# Patient Record
Sex: FEMALE | Race: BLACK OR AFRICAN AMERICAN | NOT HISPANIC OR LATINO | Employment: UNEMPLOYED | URBAN - METROPOLITAN AREA
[De-identification: names, ages, dates, MRNs, and addresses within clinical notes are randomized per-mention and may not be internally consistent; named-entity substitution may affect disease eponyms.]

---

## 2017-07-06 ENCOUNTER — HOSPITAL ENCOUNTER (OUTPATIENT)
Dept: OTHER | Facility: HOSPITAL | Age: 47
Discharge: HOME | End: 2017-07-06

## 2018-12-12 ENCOUNTER — HOSPITAL ENCOUNTER (OUTPATIENT)
Dept: OTHER | Facility: HOSPITAL | Age: 48
Discharge: HOME | End: 2018-12-12

## 2020-02-28 ENCOUNTER — HOSPITAL ENCOUNTER (OUTPATIENT)
Dept: OTHER | Facility: HOSPITAL | Age: 50
Discharge: HOME | End: 2020-02-28

## 2021-02-24 ENCOUNTER — HOSPITAL ENCOUNTER (OUTPATIENT)
Dept: OTHER | Facility: HOSPITAL | Age: 51
Discharge: HOME | End: 2021-02-24

## 2024-07-22 ENCOUNTER — APPOINTMENT (OUTPATIENT)
Dept: GENERAL RADIOLOGY | Facility: HOSPITAL | Age: 54
End: 2024-07-22
Payer: COMMERCIAL

## 2024-07-22 ENCOUNTER — APPOINTMENT (OUTPATIENT)
Facility: HOSPITAL | Age: 54
End: 2024-07-22
Payer: COMMERCIAL

## 2024-07-22 ENCOUNTER — HOSPITAL ENCOUNTER (EMERGENCY)
Facility: HOSPITAL | Age: 54
Discharge: HOME | End: 2024-07-22
Attending: EMERGENCY MEDICINE
Payer: COMMERCIAL

## 2024-07-22 ENCOUNTER — APPOINTMENT (OUTPATIENT)
Dept: CT IMAGING | Facility: HOSPITAL | Age: 54
End: 2024-07-22
Payer: COMMERCIAL

## 2024-07-22 VITALS
HEIGHT: 70 IN | RESPIRATION RATE: 16 BRPM | DIASTOLIC BLOOD PRESSURE: 94 MMHG | WEIGHT: 246.03 LBS | BODY MASS INDEX: 35.22 KG/M2 | SYSTOLIC BLOOD PRESSURE: 134 MMHG | OXYGEN SATURATION: 97 % | TEMPERATURE: 98.6 F | HEART RATE: 67 BPM

## 2024-07-22 DIAGNOSIS — M17.12 OSTEOARTHRITIS OF LEFT KNEE, UNSPECIFIED OSTEOARTHRITIS TYPE: Primary | ICD-10-CM

## 2024-07-22 DIAGNOSIS — I83.12 VARICOSE VEINS OF LEFT LOWER EXTREMITY WITH INFLAMMATION: ICD-10-CM

## 2024-07-22 DIAGNOSIS — K21.9 GASTROESOPHAGEAL REFLUX DISEASE WITHOUT ESOPHAGITIS: ICD-10-CM

## 2024-07-22 LAB
ALBUMIN SERPL-MCNC: 4.8 G/DL (ref 3.5–5.2)
ALBUMIN/GLOB SERPL: 1.5 G/DL
ALP SERPL-CCNC: 128 U/L (ref 39–117)
ALT SERPL W P-5'-P-CCNC: 23 U/L (ref 1–33)
ANION GAP SERPL CALCULATED.3IONS-SCNC: 11.9 MMOL/L (ref 5–15)
AST SERPL-CCNC: 23 U/L (ref 1–32)
BASOPHILS # BLD AUTO: 0.04 10*3/MM3 (ref 0–0.2)
BASOPHILS NFR BLD AUTO: 0.8 % (ref 0–1.5)
BILIRUB SERPL-MCNC: 1.8 MG/DL (ref 0–1.2)
BUN SERPL-MCNC: 12 MG/DL (ref 6–20)
BUN/CREAT SERPL: 14 (ref 7–25)
CALCIUM SPEC-SCNC: 10.5 MG/DL (ref 8.6–10.5)
CHLORIDE SERPL-SCNC: 101 MMOL/L (ref 98–107)
CO2 SERPL-SCNC: 28.1 MMOL/L (ref 22–29)
CREAT SERPL-MCNC: 0.86 MG/DL (ref 0.57–1)
DEPRECATED RDW RBC AUTO: 39.4 FL (ref 37–54)
EGFRCR SERPLBLD CKD-EPI 2021: 81.4 ML/MIN/1.73
EOSINOPHIL # BLD AUTO: 0.11 10*3/MM3 (ref 0–0.4)
EOSINOPHIL NFR BLD AUTO: 2.1 % (ref 0.3–6.2)
ERYTHROCYTE [DISTWIDTH] IN BLOOD BY AUTOMATED COUNT: 15.4 % (ref 12.3–15.4)
GLOBULIN UR ELPH-MCNC: 3.1 GM/DL
GLUCOSE SERPL-MCNC: 165 MG/DL (ref 65–99)
HCT VFR BLD AUTO: 41.1 % (ref 34–46.6)
HGB BLD-MCNC: 12.9 G/DL (ref 12–15.9)
HOLD SPECIMEN: NORMAL
HOLD SPECIMEN: NORMAL
IMM GRANULOCYTES # BLD AUTO: 0.01 10*3/MM3 (ref 0–0.05)
IMM GRANULOCYTES NFR BLD AUTO: 0.2 % (ref 0–0.5)
LYMPHOCYTES # BLD AUTO: 2.04 10*3/MM3 (ref 0.7–3.1)
LYMPHOCYTES NFR BLD AUTO: 39.2 % (ref 19.6–45.3)
MCH RBC QN AUTO: 22.8 PG (ref 26.6–33)
MCHC RBC AUTO-ENTMCNC: 31.4 G/DL (ref 31.5–35.7)
MCV RBC AUTO: 72.6 FL (ref 79–97)
MICROCYTES BLD QL: NORMAL
MONOCYTES # BLD AUTO: 0.3 10*3/MM3 (ref 0.1–0.9)
MONOCYTES NFR BLD AUTO: 5.8 % (ref 5–12)
NEUTROPHILS NFR BLD AUTO: 2.7 10*3/MM3 (ref 1.7–7)
NEUTROPHILS NFR BLD AUTO: 51.9 % (ref 42.7–76)
NRBC BLD AUTO-RTO: 0 /100 WBC (ref 0–0.2)
NT-PROBNP SERPL-MCNC: 129 PG/ML (ref 0–900)
OVALOCYTES BLD QL SMEAR: NORMAL
PLAT MORPH BLD: NORMAL
PLATELET # BLD AUTO: 288 10*3/MM3 (ref 140–450)
PMV BLD AUTO: 9.9 FL (ref 6–12)
POTASSIUM SERPL-SCNC: 3.7 MMOL/L (ref 3.5–5.2)
PROT SERPL-MCNC: 7.9 G/DL (ref 6–8.5)
RBC # BLD AUTO: 5.66 10*6/MM3 (ref 3.77–5.28)
SODIUM SERPL-SCNC: 141 MMOL/L (ref 136–145)
TROPONIN T SERPL HS-MCNC: <6 NG/L
WBC MORPH BLD: NORMAL
WBC NRBC COR # BLD AUTO: 5.2 10*3/MM3 (ref 3.4–10.8)
WHOLE BLOOD HOLD COAG: NORMAL
WHOLE BLOOD HOLD SPECIMEN: NORMAL

## 2024-07-22 PROCEDURE — 93971 EXTREMITY STUDY: CPT

## 2024-07-22 PROCEDURE — 93005 ELECTROCARDIOGRAM TRACING: CPT

## 2024-07-22 PROCEDURE — 25010000002 MORPHINE PER 10 MG: Performed by: EMERGENCY MEDICINE

## 2024-07-22 PROCEDURE — 73562 X-RAY EXAM OF KNEE 3: CPT

## 2024-07-22 PROCEDURE — 71045 X-RAY EXAM CHEST 1 VIEW: CPT

## 2024-07-22 PROCEDURE — 84484 ASSAY OF TROPONIN QUANT: CPT

## 2024-07-22 PROCEDURE — 80053 COMPREHEN METABOLIC PANEL: CPT

## 2024-07-22 PROCEDURE — 83880 ASSAY OF NATRIURETIC PEPTIDE: CPT

## 2024-07-22 PROCEDURE — 85025 COMPLETE CBC W/AUTO DIFF WBC: CPT

## 2024-07-22 PROCEDURE — 99285 EMERGENCY DEPT VISIT HI MDM: CPT

## 2024-07-22 PROCEDURE — 96374 THER/PROPH/DIAG INJ IV PUSH: CPT

## 2024-07-22 PROCEDURE — 25510000001 IOPAMIDOL PER 1 ML: Performed by: EMERGENCY MEDICINE

## 2024-07-22 PROCEDURE — 36415 COLL VENOUS BLD VENIPUNCTURE: CPT

## 2024-07-22 PROCEDURE — 85007 BL SMEAR W/DIFF WBC COUNT: CPT

## 2024-07-22 PROCEDURE — 25010000002 KETOROLAC TROMETHAMINE PER 15 MG: Performed by: EMERGENCY MEDICINE

## 2024-07-22 PROCEDURE — 93005 ELECTROCARDIOGRAM TRACING: CPT | Performed by: EMERGENCY MEDICINE

## 2024-07-22 PROCEDURE — 25010000002 ONDANSETRON PER 1 MG: Performed by: EMERGENCY MEDICINE

## 2024-07-22 PROCEDURE — 71260 CT THORAX DX C+: CPT

## 2024-07-22 PROCEDURE — 96375 TX/PRO/DX INJ NEW DRUG ADDON: CPT

## 2024-07-22 PROCEDURE — 93971 EXTREMITY STUDY: CPT | Performed by: SURGERY

## 2024-07-22 PROCEDURE — 93010 ELECTROCARDIOGRAM REPORT: CPT | Performed by: INTERNAL MEDICINE

## 2024-07-22 RX ORDER — CYCLOBENZAPRINE HCL 10 MG
10 TABLET ORAL ONCE
Status: COMPLETED | OUTPATIENT
Start: 2024-07-22 | End: 2024-07-22

## 2024-07-22 RX ORDER — SPIRONOLACTONE 25 MG/1
TABLET ORAL
COMMUNITY

## 2024-07-22 RX ORDER — SOTALOL HYDROCHLORIDE 80 MG/1
1 TABLET ORAL EVERY 12 HOURS SCHEDULED
COMMUNITY
Start: 2024-06-22

## 2024-07-22 RX ORDER — PANTOPRAZOLE SODIUM 40 MG/10ML
40 INJECTION, POWDER, LYOPHILIZED, FOR SOLUTION INTRAVENOUS ONCE
Status: COMPLETED | OUTPATIENT
Start: 2024-07-22 | End: 2024-07-22

## 2024-07-22 RX ORDER — APIXABAN 5 MG/1
5 TABLET, FILM COATED ORAL 2 TIMES DAILY
COMMUNITY

## 2024-07-22 RX ORDER — DULAGLUTIDE 1.5 MG/.5ML
INJECTION, SOLUTION SUBCUTANEOUS
COMMUNITY

## 2024-07-22 RX ORDER — PANTOPRAZOLE SODIUM 40 MG/1
1 TABLET, DELAYED RELEASE ORAL DAILY
COMMUNITY
Start: 2024-04-25

## 2024-07-22 RX ORDER — POTASSIUM CHLORIDE 750 MG/1
CAPSULE, EXTENDED RELEASE ORAL
COMMUNITY

## 2024-07-22 RX ORDER — FUROSEMIDE 20 MG/1
TABLET ORAL
COMMUNITY

## 2024-07-22 RX ORDER — SEMAGLUTIDE 0.68 MG/ML
INJECTION, SOLUTION SUBCUTANEOUS
COMMUNITY
Start: 2024-04-29

## 2024-07-22 RX ORDER — DILTIAZEM HYDROCHLORIDE 120 MG/1
CAPSULE, COATED, EXTENDED RELEASE ORAL
COMMUNITY

## 2024-07-22 RX ORDER — HYDROCODONE BITARTRATE AND ACETAMINOPHEN 5; 325 MG/1; MG/1
1 TABLET ORAL EVERY 6 HOURS PRN
Status: DISCONTINUED | OUTPATIENT
Start: 2024-07-22 | End: 2024-07-22

## 2024-07-22 RX ORDER — FLECAINIDE ACETATE 50 MG/1
TABLET ORAL
COMMUNITY

## 2024-07-22 RX ORDER — METOPROLOL SUCCINATE 100 MG/1
TABLET, EXTENDED RELEASE ORAL
COMMUNITY

## 2024-07-22 RX ORDER — ATORVASTATIN CALCIUM 20 MG/1
20 TABLET, FILM COATED ORAL DAILY
COMMUNITY

## 2024-07-22 RX ORDER — FAMOTIDINE 20 MG/1
20 TABLET, FILM COATED ORAL 2 TIMES DAILY
Qty: 30 TABLET | Refills: 0 | Status: SHIPPED | OUTPATIENT
Start: 2024-07-22

## 2024-07-22 RX ORDER — SEMAGLUTIDE 1.34 MG/ML
INJECTION, SOLUTION SUBCUTANEOUS
COMMUNITY

## 2024-07-22 RX ORDER — ONDANSETRON 2 MG/ML
4 INJECTION INTRAMUSCULAR; INTRAVENOUS ONCE
Status: COMPLETED | OUTPATIENT
Start: 2024-07-22 | End: 2024-07-22

## 2024-07-22 RX ORDER — SODIUM CHLORIDE 0.9 % (FLUSH) 0.9 %
10 SYRINGE (ML) INJECTION AS NEEDED
Status: DISCONTINUED | OUTPATIENT
Start: 2024-07-22 | End: 2024-07-23 | Stop reason: HOSPADM

## 2024-07-22 RX ORDER — INSULIN LISPRO 100 [IU]/ML
INJECTION, SOLUTION INTRAVENOUS; SUBCUTANEOUS
COMMUNITY

## 2024-07-22 RX ORDER — HYDROCODONE BITARTRATE AND ACETAMINOPHEN 5; 325 MG/1; MG/1
1 TABLET ORAL EVERY 6 HOURS PRN
Qty: 12 TABLET | Refills: 0 | Status: SHIPPED | OUTPATIENT
Start: 2024-07-22

## 2024-07-22 RX ORDER — DILTIAZEM HYDROCHLORIDE 60 MG/1
TABLET, FILM COATED ORAL
COMMUNITY

## 2024-07-22 RX ORDER — KETOROLAC TROMETHAMINE 15 MG/ML
15 INJECTION, SOLUTION INTRAMUSCULAR; INTRAVENOUS ONCE
Status: COMPLETED | OUTPATIENT
Start: 2024-07-22 | End: 2024-07-22

## 2024-07-22 RX ORDER — HYDROCODONE BITARTRATE AND ACETAMINOPHEN 5; 325 MG/1; MG/1
1 TABLET ORAL ONCE
Status: COMPLETED | OUTPATIENT
Start: 2024-07-22 | End: 2024-07-22

## 2024-07-22 RX ADMIN — HYDROCODONE BITARTRATE AND ACETAMINOPHEN 1 TABLET: 5; 325 TABLET ORAL at 21:09

## 2024-07-22 RX ADMIN — PANTOPRAZOLE SODIUM 40 MG: 40 INJECTION, POWDER, FOR SOLUTION INTRAVENOUS at 21:08

## 2024-07-22 RX ADMIN — CYCLOBENZAPRINE HYDROCHLORIDE 10 MG: 10 TABLET, FILM COATED ORAL at 22:05

## 2024-07-22 RX ADMIN — IOPAMIDOL 100 ML: 755 INJECTION, SOLUTION INTRAVENOUS at 21:33

## 2024-07-22 RX ADMIN — ONDANSETRON 4 MG: 2 INJECTION INTRAMUSCULAR; INTRAVENOUS at 19:40

## 2024-07-22 RX ADMIN — KETOROLAC TROMETHAMINE 15 MG: 15 INJECTION, SOLUTION INTRAMUSCULAR; INTRAVENOUS at 19:40

## 2024-07-22 RX ADMIN — MORPHINE SULFATE 4 MG: 4 INJECTION, SOLUTION INTRAMUSCULAR; INTRAVENOUS at 19:40

## 2024-07-22 NOTE — ED PROVIDER NOTES
Time: 6:53 PM EDT  Date of encounter:  7/22/2024  Independent Historian/Clinical History and Information was obtained by:   Patient    History is limited by: N/A    Chief Complaint   Patient presents with    Chest Pain    Leg Pain         History of Present Illness:  Patient is a 54 y.o. year old female who presents to the emergency department for evaluation of left leg pain behind left knee.  Patient has recently had 2 long distance trips and is concerned about a possible blood clot.  She has a history of CHF.  Her primary care provider wanted her to start taking Eliquis due to fear she may develop a clot because of her circulation problems.  She started Eliquis 2 to 3 days ago.    Patient has traveled last month from [place] to [place].  She started driving today to [place] and due to the pain she had to turnaround to come home.  He describes the pain as a throbbing sensation.  It starts in the posterior left knee and radiates up into her thigh.  Patient states she is also having chest pain that is a burning sensation.  She takes Protonix for GERD.  She has not taken it today due to not eating prior.  Patient denies fall or injury.  She admits to shortness of breath.    Patient Care Team  Primary Care Provider: Mindy Chavez APRN    Past Medical History:     Allergies   Allergen Reactions    Fentanyl Hives    Promethazine Itching     Past Medical History:   Diagnosis Date    A-fib     CHF (congestive heart failure)     DM (diabetes mellitus)     HTN (hypertension)      Past Surgical History:   Procedure Laterality Date    GALLBLADDER SURGERY      HERNIA REPAIR      HYSTERECTOMY      PACEMAKER IMPLANTATION  2014     History reviewed. No pertinent family history.    Home Medications:  Prior to Admission medications    Not on File        Social History:   Social History     Tobacco Use    Smoking status: Never    Smokeless tobacco: Never   Substance Use Topics    Alcohol use: Never    Drug use: Never  "        Review of Systems:  Review of Systems   Constitutional: Negative.    HENT: Negative.     Eyes: Negative.    Respiratory:  Positive for shortness of breath.    Cardiovascular:  Positive for chest pain (Burning sensation).   Gastrointestinal: Negative.    Endocrine: Negative.    Genitourinary: Negative.    Musculoskeletal:  Positive for arthralgias and myalgias.   Skin: Negative.  Negative for color change and wound.   Allergic/Immunologic: Negative.    Neurological: Negative.    Hematological: Negative.    Psychiatric/Behavioral: Negative.          Physical Exam:  /94 (BP Location: Right arm, Patient Position: Sitting)   Pulse 67   Temp 98.6 °F (37 °C) (Oral)   Resp 16   Ht 177.8 cm (70\")   Wt 112 kg (246 lb 0.5 oz)   SpO2 97%   BMI 35.30 kg/m²         Physical Exam  Vitals and nursing note reviewed.   Constitutional:       Appearance: Normal appearance.   HENT:      Head: Normocephalic and atraumatic.      Nose: Nose normal.      Mouth/Throat:      Mouth: Mucous membranes are moist.   Eyes:      Extraocular Movements: Extraocular movements intact.      Pupils: Pupils are equal, round, and reactive to light.   Cardiovascular:      Rate and Rhythm: Normal rate and regular rhythm.      Heart sounds: Normal heart sounds.   Pulmonary:      Effort: Pulmonary effort is normal.      Breath sounds: Normal breath sounds.   Abdominal:      General: Abdomen is flat.      Palpations: Abdomen is soft.      Tenderness: There is abdominal tenderness in the epigastric area.   Musculoskeletal:         General: Tenderness present. Normal range of motion.      Cervical back: Normal range of motion and neck supple.      Left foot: Normal capillary refill. No swelling. Normal pulse.        Legs:    Skin:     General: Skin is warm and dry.      Findings: No bruising or erythema.   Neurological:      General: No focal deficit present.      Mental Status: She is alert and oriented to person, place, and time. "   Psychiatric:         Mood and Affect: Mood normal.         Behavior: Behavior normal.                  Procedures:  Procedures      Medical Decision Making:      Comorbidities that affect care:    Atrial Fibrillation, Congestive Heart Failure, Diabetes, Hypertension, Obesity    External Notes reviewed:    Previous Clinic Note: PCP visit 4/5/2024      The following orders were placed and all results were independently analyzed by me:  Orders Placed This Encounter   Procedures    XR Chest 1 View    XR Knee 3 View Left    CT Chest With Contrast Diagnostic    [place] Draw    Comprehensive Metabolic Panel    BNP    Single High Sensitivity Troponin T    CBC Auto Differential    Scan Slide    Undress & Gown    Continuous Pulse Oximetry    Vital Signs    ECG 12 Lead ED Triage Standing Order; SOA    CBC & Differential    Green Top (Gel)    Lavender Top    Gold Top - SST    Light Blue Top       Medications Given in the Emergency Department:  Medications   morphine injection 4 mg (4 mg Intravenous Given 7/22/24 1940)   ondansetron (ZOFRAN) injection 4 mg (4 mg Intravenous Given 7/22/24 1940)   ketorolac (TORADOL) injection 15 mg (15 mg Intravenous Given 7/22/24 1940)   pantoprazole (PROTONIX) injection 40 mg (40 mg Intravenous Given 7/22/24 2108)   HYDROcodone-acetaminophen (NORCO) 5-325 MG per tablet 1 tablet (1 tablet Oral Given 7/22/24 2109)   iopamidol (ISOVUE-370) 76 % injection 100 mL (100 mL Intravenous Given 7/22/24 2133)   cyclobenzaprine (FLEXERIL) tablet 10 mg (10 mg Oral Given 7/22/24 2205)        ED Course:    The patient was initially evaluated in the triage area where orders were placed. The patient was later dispositioned by Anthony Meyer PA-C.      The patient was advised to stay for completion of workup which includes but is not limited to communication of labs and radiological results, reassessment and plan. The patient was advised that leaving prior to disposition by a provider could result in  critical findings that are not communicated to the patient.     ED Course as of 07/24/24 0011   Mon Jul 22, 2024 1853   --- PROVIDER IN TRIAGE NOTE ---    Patient was seen and evaluated in triage by me SANTA Matthews.  Orders were written and the patient is currently awaiting disposition.   [MS]   1853 Patient recently drove from [place] to [place] about a month ago.  And over the past 2 days she has driven to [place] and back. [MS]   2039 Vascular tech advises that patient is negative for DVT [MS]      ED Course User Index  [MS] Corry Bowman APRN       Labs:    Lab Results (last 24 hours)       ** No results found for the last 24 hours. **             Imaging:    No Radiology Exams Resulted Within Past 24 Hours      Differential Diagnosis and Discussion:      Abdominal Pain: Based on the patient's signs and symptoms, I considered abdominal aortic aneurysm, small bowel obstruction, pancreatitis, acute cholecystitis, acute appendecitis, peptic ulcer disease, gastritis, colitis, endocrine disorders, irritable bowel syndrome and other differential diagnosis an etiology of the patient's abdominal pain.  Chest Pain:  Based on the patient's signs and symptoms, I considered aortic dissection, myocardial infaction, pulmonary embolism, cardiac tamponade, pericarditis, pneumothorax, musculoskeletal chest pain and other differential diagnosis as an etiology of the patient's chest pain.   Extremity Pain: Differential diagnosis includes but is not limited to soft tissue sprain, tendonitis, tendon injury, dislocation, fracture, deep vein thrombosis, arterial insufficiency, osteoarthritis, bursitis, and ligamentous damage.    All labs were reviewed and interpreted by me.  All X-rays impressions were independently interpreted by me.  EKG was interpreted by me.  EKG was interpreted by supervising attending.  CT scan radiology impression was interpreted by me.  Ultrasound impression was interpreted by me.     MDM      Amount and/or Complexity of Data Reviewed  Clinical lab tests: reviewed  Tests in the radiology section of CPT®: reviewed  Tests in the medicine section of CPT®: reviewed                 Patient Care Considerations:    SEPSIS was considered but is NOT present in the emergency department as SIRS criteria is not present.      Consultants/Shared Management Plan:    None    Social Determinants of Health:    Patient is independent, reliable, and has access to care.       Disposition and Care Coordination:    Discharged: The patient is suitable and stable for discharge with no need for consideration of admission.    I have explained the patient´s condition, diagnoses and treatment plan based on the information available to me at this time. I have answered questions and addressed any concerns. The patient has a good  understanding of the patient´s diagnosis, condition, and treatment plan as can be expected at this point. The vital signs have been stable. The patient´s condition is stable and appropriate for discharge from the emergency department.      The patient will pursue further outpatient evaluation with the primary care physician or other designated or consulting physician as outlined in the discharge instructions. They are agreeable to this plan of care and follow-up instructions have been explained in detail. The patient has received these instructions in written format and has expressed an understanding of the discharge instructions. The patient is aware that any significant change in condition or worsening of symptoms should prompt an immediate return to this or the closest emergency department or call to 911.  I have explained discharge medications and the need for follow up with the patient/caretakers. This was also printed in the discharge instructions. Patient was discharged with the following medications and follow up:      Medication List        New Prescriptions      famotidine 20 MG tablet  Commonly  known as: PEPCID  Take 1 tablet by mouth 2 (Two) Times a Day.     HYDROcodone-acetaminophen 5-325 MG per tablet  Commonly known as: NORCO  Take 1 tablet by mouth Every 6 (Six) Hours As Needed for Mild Pain or Moderate Pain.               Where to Get Your Medications        These medications were sent to [facility] [address], [place] - [address] - [phone]  - [phone]   [address] [address]      Phone: [phone]   famotidine 20 MG tablet  HYDROcodone-acetaminophen 5-325 MG per tablet      Mindy Chavez, APRN  [address]  [address]  [place] [address] [phone]    Schedule an appointment as soon as possible for a visit          Final diagnoses:   Osteoarthritis of left knee, unspecified osteoarthritis type   Varicose veins of left lower extremity with inflammation   Gastroesophageal reflux disease without esophagitis        ED Disposition       ED Disposition   Discharge    Condition   Stable    Comment   --               This medical record created using voice recognition software.             Anthony Meyer PA-C  07/24/24 0011

## 2024-07-23 LAB
BH CV LOWER VASCULAR LEFT COMMON FEMORAL AUGMENT: NORMAL
BH CV LOWER VASCULAR LEFT COMMON FEMORAL COMPETENT: NORMAL
BH CV LOWER VASCULAR LEFT COMMON FEMORAL COMPRESS: NORMAL
BH CV LOWER VASCULAR LEFT COMMON FEMORAL PHASIC: NORMAL
BH CV LOWER VASCULAR LEFT COMMON FEMORAL SPONT: NORMAL
BH CV LOWER VASCULAR LEFT DISTAL FEMORAL COMPRESS: NORMAL
BH CV LOWER VASCULAR LEFT GASTRONEMIUS COMPRESS: NORMAL
BH CV LOWER VASCULAR LEFT GREATER SAPH AK AUGMENT: NORMAL
BH CV LOWER VASCULAR LEFT GREATER SAPH AK COMPETENT: NORMAL
BH CV LOWER VASCULAR LEFT GREATER SAPH AK COMPRESS: NORMAL
BH CV LOWER VASCULAR LEFT GREATER SAPH AK PHASIC: NORMAL
BH CV LOWER VASCULAR LEFT GREATER SAPH AK SPONT: NORMAL
BH CV LOWER VASCULAR LEFT GREATER SAPH BK COMPRESS: NORMAL
BH CV LOWER VASCULAR LEFT LESSER SAPH COMPRESS: NORMAL
BH CV LOWER VASCULAR LEFT MID FEMORAL AUGMENT: NORMAL
BH CV LOWER VASCULAR LEFT MID FEMORAL COMPETENT: NORMAL
BH CV LOWER VASCULAR LEFT MID FEMORAL COMPRESS: NORMAL
BH CV LOWER VASCULAR LEFT MID FEMORAL PHASIC: NORMAL
BH CV LOWER VASCULAR LEFT MID FEMORAL SPONT: NORMAL
BH CV LOWER VASCULAR LEFT PERONEAL AUGMENT: NORMAL
BH CV LOWER VASCULAR LEFT PERONEAL COMPETENT: NORMAL
BH CV LOWER VASCULAR LEFT PERONEAL COMPRESS: NORMAL
BH CV LOWER VASCULAR LEFT PERONEAL PHASIC: NORMAL
BH CV LOWER VASCULAR LEFT PERONEAL SPONT: NORMAL
BH CV LOWER VASCULAR LEFT POPLITEAL AUGMENT: NORMAL
BH CV LOWER VASCULAR LEFT POPLITEAL COMPETENT: NORMAL
BH CV LOWER VASCULAR LEFT POPLITEAL COMPRESS: NORMAL
BH CV LOWER VASCULAR LEFT POPLITEAL PHASIC: NORMAL
BH CV LOWER VASCULAR LEFT POPLITEAL SPONT: NORMAL
BH CV LOWER VASCULAR LEFT POSTERIOR TIBIAL AUGMENT: NORMAL
BH CV LOWER VASCULAR LEFT POSTERIOR TIBIAL COMPETENT: NORMAL
BH CV LOWER VASCULAR LEFT POSTERIOR TIBIAL COMPRESS: NORMAL
BH CV LOWER VASCULAR LEFT POSTERIOR TIBIAL PHASIC: NORMAL
BH CV LOWER VASCULAR LEFT POSTERIOR TIBIAL SPONT: NORMAL
BH CV LOWER VASCULAR LEFT PROXIMAL FEMORAL COMPRESS: NORMAL
BH CV LOWER VASCULAR RIGHT COMMON FEMORAL AUGMENT: NORMAL
BH CV LOWER VASCULAR RIGHT COMMON FEMORAL COMPETENT: NORMAL
BH CV LOWER VASCULAR RIGHT COMMON FEMORAL COMPRESS: NORMAL
BH CV LOWER VASCULAR RIGHT COMMON FEMORAL PHASIC: NORMAL
BH CV LOWER VASCULAR RIGHT COMMON FEMORAL SPONT: NORMAL
BH CV VAS PRELIMINARY FINDINGS SCRIPTING: 1
QT INTERVAL: 378 MS
QTC INTERVAL: 441 MS

## 2024-07-23 NOTE — DISCHARGE INSTRUCTIONS
We have ruled out congestive heart failure, DVT and PE during her ED visit    X-ray of your knee shows that you have osteoarthritis of the left knee  Despite compression stockings for your varicose veins and elevate.  You can apply warm compresses to the area.    Take Tylenol for pain.  I have sent hydrocodone.  Please take only as needed.  I also sent muscle relaxers.  Please follow-up with your PCP

## 2024-08-14 ENCOUNTER — OFFICE VISIT (OUTPATIENT)
Dept: CARDIOLOGY | Facility: CLINIC | Age: 54
End: 2024-08-14
Payer: COMMERCIAL

## 2024-08-14 ENCOUNTER — CLINICAL SUPPORT NO REQUIREMENTS (OUTPATIENT)
Dept: CARDIOLOGY | Facility: CLINIC | Age: 54
End: 2024-08-14
Payer: COMMERCIAL

## 2024-08-14 VITALS
DIASTOLIC BLOOD PRESSURE: 81 MMHG | HEIGHT: 70 IN | HEART RATE: 69 BPM | BODY MASS INDEX: 34.88 KG/M2 | WEIGHT: 243.6 LBS | SYSTOLIC BLOOD PRESSURE: 111 MMHG

## 2024-08-14 DIAGNOSIS — I48.0 PAROXYSMAL ATRIAL FIBRILLATION: ICD-10-CM

## 2024-08-14 DIAGNOSIS — Z79.01 CURRENT USE OF LONG TERM ANTICOAGULATION: ICD-10-CM

## 2024-08-14 DIAGNOSIS — I49.5 SSS (SICK SINUS SYNDROME): ICD-10-CM

## 2024-08-14 DIAGNOSIS — R42 DIZZINESS: ICD-10-CM

## 2024-08-14 DIAGNOSIS — I47.10 PAROXYSMAL SVT (SUPRAVENTRICULAR TACHYCARDIA): ICD-10-CM

## 2024-08-14 DIAGNOSIS — Z95.0 CARDIAC PACEMAKER: ICD-10-CM

## 2024-08-14 DIAGNOSIS — Z45.018 ENCOUNTER FOR INTERROGATION OF CARDIAC PACEMAKER: ICD-10-CM

## 2024-08-14 DIAGNOSIS — Z95.0 PRESENCE OF CARDIAC PACEMAKER: ICD-10-CM

## 2024-08-14 DIAGNOSIS — R00.2 PALPITATIONS: Primary | ICD-10-CM

## 2024-08-14 DIAGNOSIS — I48.0 PAROXYSMAL ATRIAL FIBRILLATION: Primary | ICD-10-CM

## 2024-08-14 RX ORDER — DAPAGLIFLOZIN 5 MG/1
1 TABLET, FILM COATED ORAL DAILY
COMMUNITY
End: 2024-08-14

## 2024-08-14 RX ORDER — DILTIAZEM HYDROCHLORIDE 180 MG/1
180 CAPSULE, EXTENDED RELEASE ORAL DAILY
Qty: 90 CAPSULE | Refills: 3 | Status: SHIPPED | OUTPATIENT
Start: 2024-08-14

## 2024-08-14 RX ORDER — DAPAGLIFLOZIN 5 MG/1
10 TABLET, FILM COATED ORAL DAILY
Status: SHIPPED | COMMUNITY
Start: 2024-08-14

## 2024-08-14 NOTE — PROGRESS NOTES
Normal Dual Chamber Pacemaker Device Interrogation and Device Testing.  Normal evaluation of device function and lead measurements.  No optimization was needed of parameters or maximization of device longevity.  Patient is on Manual downloads and I have requested her to do monthly remotes.

## 2024-08-14 NOTE — LETTER
August 14, 2024     SANTA Melgar  [address]  [address]  [place] [address]    Patient: Bisi Gutierrez   YOB: 1970   Date of Visit: 8/14/2024       Dear SANTA Melgar,    Bisi Gutierrez was in my office today. Below are the relevant portions of my assessment and plan of care.           If you have questions, please do not hesitate to call me. I look forward to following Bisi along with you.         Sincerely,        Harriet Perez MD        CC: No Recipients

## 2024-08-14 NOTE — PROGRESS NOTES
[facility]  INTERVENTIONAL CARDIOLOGY NEW PATIENT OFFICE VISIT      Chief Complaint  Atrial Fibrillation and Pacemaker Check    Subjective          History of Present Illness    Bisi Gutierrez is a 54 y.o. female with past medical history of tachybradycardia syndrome in 2014 status post dual-chamber pacemaker, paroxysmal atrial fibrillation status post 2 ablations on Eliquis 5 mg twice daily and Cardizem, who moved to [place] from [place] 2 months back and here to establish cardiology care.  Patient states since she moved here she has been having episodes of palpitation and dizziness.  She denies syncope, chest pain, leg edema.  Device interrogation done today showed episodes of SVT.  Patient takes Cardizem 120 mg long-acting and as needed Cardizem 60 mg daily.  Patient also takes sotalol 80 mg twice daily.  Device interrogation also showed 13% atrial paced and 0% ventricular paced.  Patient states she had left heart cath before moving to [place] which showed normal coronary arteries.  Patient also had PET CT scan back in 12/2021 which showed no perfusion defect and LVEF was preserved.  Patient recently visited to ED after moving from [place] to here and suspicion for DVT PE which were negative.      Past History:  Past Medical History:   Diagnosis Date    A-fib     CHF (congestive heart failure)     DM (diabetes mellitus)     HTN (hypertension)        Medical History:  Past Medical History:   Diagnosis Date    A-fib     CHF (congestive heart failure)     DM (diabetes mellitus)     HTN (hypertension)        Surgical History:   has a past surgical history that includes Pacemaker Implantation (2014); Gallbladder surgery; Hysterectomy; and Hernia repair.     Family History:   family history is not on file.     Social History:   reports that she has never smoked. She has never used smokeless tobacco. She reports that she does not drink alcohol and does not use drugs.    Allergies:   Fentanyl and  Promethazine    Current Outpatient Medications on File Prior to Visit   Medication Sig    atorvastatin (LIPITOR) 20 MG tablet Take 1 tablet by mouth Daily.    Eliquis 5 MG tablet tablet Take 1 tablet by mouth 2 (Two) Times a Day.    famotidine (PEPCID) 20 MG tablet Take 1 tablet by mouth 2 (Two) Times a Day.    Farxiga 5 MG tablet tablet Take 2 tablets by mouth Daily. 10 mg    Ozempic, 0.25 or 0.5 MG/DOSE, 2 MG/3ML solution pen-injector INJECT 0.5 MG UNDER THE SKIN EVERY WEEK AS DIRECTED    pantoprazole (PROTONIX) 40 MG EC tablet Take 1 tablet by mouth Daily.    sotalol (BETAPACE) 80 MG tablet Take 1 tablet by mouth Every 12 (Twelve) Hours.    spironolactone (ALDACTONE) 25 MG tablet spironolactone 25 mg tablet   TAKE 1 TABLET BY MOUTH DAILY    [DISCONTINUED] dilTIAZem (CARDIZEM) 60 MG tablet TAKE 1 TABLET BY MOUTH ONCE DAILY AS NEEDED FOR PALPITATIONS FOR 15 DAYS    [DISCONTINUED] dilTIAZem CD (CARDIZEM CD) 120 MG 24 hr capsule Cartia  mg capsule,extended release    [DISCONTINUED] Farxiga 5 MG tablet tablet Take 1 tablet by mouth Daily. 10 mg    [DISCONTINUED] Dulaglutide (Trulicity) 1.5 MG/0.5ML solution pen-injector Trulicity 1.5 mg/0.5 mL subcutaneous pen injector (Patient not taking: Reported on 8/14/2024)    [DISCONTINUED] empagliflozin (Jardiance) 25 MG tablet tablet Take 1 tablet by mouth Daily. (Patient not taking: Reported on 8/14/2024)    [DISCONTINUED] flecainide (TAMBOCOR) 50 MG tablet flecainide 50 mg tablet (Patient not taking: Reported on 8/14/2024)    [DISCONTINUED] furosemide (LASIX) 20 MG tablet furosemide 20 mg tablet (Patient not taking: Reported on 8/14/2024)    [DISCONTINUED] HYDROcodone-acetaminophen (NORCO) 5-325 MG per tablet Take 1 tablet by mouth Every 6 (Six) Hours As Needed for Mild Pain or Moderate Pain. (Patient not taking: Reported on 8/14/2024)    [DISCONTINUED] Insulin Lispro (humaLOG) 100 UNIT/ML injection Inject  under the skin into the appropriate area as directed. (Patient  "not taking: Reported on 8/14/2024)    [DISCONTINUED] metoprolol succinate XL (TOPROL-XL) 100 MG 24 hr tablet metoprolol succinate  mg tablet,extended release 24 hr (Patient not taking: Reported on 8/14/2024)    [DISCONTINUED] metoprolol tartrate (LOPRESSOR) 25 MG tablet Take 1 tablet by mouth 2 (Two) Times a Day. (Patient not taking: Reported on 8/14/2024)    [DISCONTINUED] potassium chloride (MICRO-K) 10 MEQ CR capsule potassium chloride ER 10 mEq capsule,extended release (Patient not taking: Reported on 8/14/2024)    [DISCONTINUED] Semaglutide,0.25 or 0.5MG/DOS, (Ozempic, 0.25 or 0.5 MG/DOSE,) 2 MG/1.5ML solution pen-injector Ozempic 0.25 mg or 0.5 mg (2 mg/1.5 mL) subcutaneous pen injector   Inject by subcutaneous route for 30 days. (Patient not taking: Reported on 8/14/2024)     No current facility-administered medications on file prior to visit.          Review of Systems   Negative ROS except as mentioned in HPI above.     Objective     /81   Pulse 69   Ht 177.8 cm (70\")   Wt 110 kg (243 lb 9.6 oz)   BMI 34.95 kg/m²       Physical Exam  General : Alert, awake, no acute distress  Neck : Supple, no carotid bruit, no jugular venous distention  CVS : Regular rate and rhythm, no murmur, rubs or gallops  Lungs: Clear to auscultation bilaterally, no crackles or rhonchi  Abdomen: Soft, nontender, bowel sounds heard in all 4 quadrants  Extremities: Warm, well-perfused, no pedal edema      Result Review :     The following data was reviewed by: Harriet Perez MD on 08/14/2024:    CMP          7/22/2024    19:20   CMP   Glucose 165    BUN 12    Creatinine 0.86    EGFR 81.4    Sodium 141    Potassium 3.7    Chloride 101    Calcium 10.5    Total Protein 7.9    Albumin 4.8    Globulin 3.1    Total Bilirubin 1.8    Alkaline Phosphatase 128    AST (SGOT) 23    ALT (SGPT) 23    Albumin/Globulin Ratio 1.5    BUN/Creatinine Ratio 14.0    Anion Gap 11.9      CBC          7/22/2024    19:20   CBC   WBC 5.20  "   RBC 5.66    Hemoglobin 12.9    Hematocrit 41.1    MCV 72.6    MCH 22.8    MCHC 31.4    RDW 15.4    Platelets 288               Data reviewed: Cardiology studies        No results found for this or any previous visit.          Procedures    The ASCVD Risk score (Nick SANTOS, et al., 2019) failed to calculate for the following reasons:    The valid total cholesterol range is 130 to 320 mg/dL         Assessment and Plan      Diagnoses and all orders for this visit:    1. Palpitations (Primary)  -     Thyroid Panel With TSH; Future    2. Paroxysmal atrial fibrillation    3. Presence of cardiac pacemaker    4. Encounter for interrogation of cardiac pacemaker    5. Paroxysmal SVT (supraventricular tachycardia)    6. Dizziness    7. Current use of long term anticoagulation    Other orders  -     dilTIAZem XR (DILACOR XR) 180 MG 24 hr capsule; Take 1 capsule by mouth Daily.  Dispense: 90 capsule; Refill: 3      Plan  - Patient with dual-chamber pacemaker in 2015, A-fib ablation on Eliquis.  Pacemaker interrogation shows nonsustained SVT episodes.  Will uptitrate Cardizem to 180 mg long-acting daily from 120 mg daily.  Will follow-up patient in 4 months or sooner if needed.  If patient continues to have dizziness or intermittent SVT episodes that is worsening in his symptoms, may need to refer to EP for SVT ablation.  Patient has more than 1.5 years for end of battery life.   -Will obtain thyroid panel.        Bisi Gutierrez  reports that she has never smoked. She has never used smokeless tobacco.            Follow Up     No follow-ups on file.    Patient was given instructions and counseling regarding her condition or for health maintenance advice. Please see specific information pulled into the AVS if appropriate.   Patient was educated on cardiac diet, adequate exercise and achieving/maintaining optimal weight.     I spent 61 minutes caring for Bisi on this date of service. This time includes time spent by me in the  following activities: preparing for the visit, reviewing tests, performing a medically appropriate examination and/or evaluation, counseling and educating the patient/family/caregiver, documenting information in the medical record, independently interpreting results and communicating that information with the patient/family/caregiver, ordering medications, ordering test(s), obtaining a separately obtained history, and reviewing a separately obtained history    Harriet Perez MD  08/14/24  10:30 EDT    Dictated Utilizing Dragon Dictation

## 2024-09-16 ENCOUNTER — DOCUMENTATION (OUTPATIENT)
Dept: CARDIOLOGY | Facility: CLINIC | Age: 54
End: 2024-09-16
Payer: COMMERCIAL

## 2024-09-16 ENCOUNTER — TELEPHONE (OUTPATIENT)
Dept: CARDIOLOGY | Facility: CLINIC | Age: 54
End: 2024-09-16
Payer: COMMERCIAL

## 2024-09-17 ENCOUNTER — DOCUMENTATION (OUTPATIENT)
Dept: CARDIOLOGY | Facility: CLINIC | Age: 54
End: 2024-09-17
Payer: COMMERCIAL

## 2024-09-17 ENCOUNTER — OFFICE VISIT (OUTPATIENT)
Dept: CARDIOLOGY | Facility: CLINIC | Age: 54
End: 2024-09-17
Payer: COMMERCIAL

## 2024-09-17 VITALS
SYSTOLIC BLOOD PRESSURE: 123 MMHG | DIASTOLIC BLOOD PRESSURE: 87 MMHG | WEIGHT: 246.8 LBS | HEART RATE: 84 BPM | BODY MASS INDEX: 35.33 KG/M2 | HEIGHT: 70 IN

## 2024-09-17 DIAGNOSIS — R07.89 CHEST PAIN, ATYPICAL: ICD-10-CM

## 2024-09-17 DIAGNOSIS — R60.0 EDEMA LEG: ICD-10-CM

## 2024-09-17 DIAGNOSIS — R42 DIZZINESS: Primary | ICD-10-CM

## 2024-09-17 DIAGNOSIS — R53.83 FATIGUE, UNSPECIFIED TYPE: ICD-10-CM

## 2024-09-17 PROCEDURE — 99214 OFFICE O/P EST MOD 30 MIN: CPT | Performed by: INTERNAL MEDICINE

## 2024-09-17 PROCEDURE — 93000 ELECTROCARDIOGRAM COMPLETE: CPT | Performed by: INTERNAL MEDICINE

## 2024-09-17 RX ORDER — FUROSEMIDE 20 MG
20 TABLET ORAL DAILY
Qty: 90 TABLET | Refills: 2 | Status: SHIPPED | OUTPATIENT
Start: 2024-09-17

## 2024-09-26 ENCOUNTER — TRANSCRIBE ORDERS (OUTPATIENT)
Dept: ADMINISTRATIVE | Facility: HOSPITAL | Age: 54
End: 2024-09-26
Payer: COMMERCIAL

## 2024-09-26 DIAGNOSIS — Z12.31 SCREENING MAMMOGRAM FOR HIGH-RISK PATIENT: Primary | ICD-10-CM

## 2024-09-30 ENCOUNTER — TELEPHONE (OUTPATIENT)
Dept: CARDIOLOGY | Facility: CLINIC | Age: 54
End: 2024-09-30

## 2024-09-30 NOTE — TELEPHONE ENCOUNTER
Caller: Bisi Gutierrez     Relationship: [unfilled]     Best call back number: [phone]    What is your medical concern? PT WENT IN TO GET NUCLEAR STRESS TEST AND THEY WERE ASKING 1000 OUT OF POCKET FOR THE PAYMENT AND PT COULD NOT AFFORD THAT MUCH AND IS ASKING FOR ALTERNATIVES     How long has this issue been going on? 1 DAY     Is your provider already aware of this issue? NO    Have you been treated for this issue? NO

## 2024-10-01 NOTE — TELEPHONE ENCOUNTER
I spoke to patient and she is agreeable to a treadmill if she is a good candidate for that test instead. Please advise.

## 2024-10-02 ENCOUNTER — HOSPITAL ENCOUNTER (EMERGENCY)
Facility: HOSPITAL | Age: 54
Discharge: HOME | End: 2024-10-02
Attending: EMERGENCY MEDICINE
Payer: COMMERCIAL

## 2024-10-02 ENCOUNTER — APPOINTMENT (OUTPATIENT)
Dept: GENERAL RADIOLOGY | Facility: HOSPITAL | Age: 54
End: 2024-10-02
Payer: COMMERCIAL

## 2024-10-02 VITALS
TEMPERATURE: 98.4 F | DIASTOLIC BLOOD PRESSURE: 88 MMHG | SYSTOLIC BLOOD PRESSURE: 145 MMHG | HEART RATE: 75 BPM | HEIGHT: 70 IN | OXYGEN SATURATION: 95 % | RESPIRATION RATE: 24 BRPM | BODY MASS INDEX: 34.02 KG/M2 | WEIGHT: 237.66 LBS

## 2024-10-02 DIAGNOSIS — R00.2 PALPITATIONS: Primary | ICD-10-CM

## 2024-10-02 LAB
ALBUMIN SERPL-MCNC: 4.2 G/DL (ref 3.5–5.2)
ALBUMIN/GLOB SERPL: 1.3 G/DL
ALP SERPL-CCNC: 104 U/L (ref 39–117)
ALT SERPL W P-5'-P-CCNC: 17 U/L (ref 1–33)
ANION GAP SERPL CALCULATED.3IONS-SCNC: 13.6 MMOL/L (ref 5–15)
ANISOCYTOSIS BLD QL: NORMAL
AST SERPL-CCNC: 20 U/L (ref 1–32)
BASOPHILS # BLD AUTO: 0.04 10*3/MM3 (ref 0–0.2)
BASOPHILS NFR BLD AUTO: 0.8 % (ref 0–1.5)
BILIRUB SERPL-MCNC: 1.2 MG/DL (ref 0–1.2)
BUN SERPL-MCNC: 13 MG/DL (ref 6–20)
BUN/CREAT SERPL: 14.8 (ref 7–25)
CALCIUM SPEC-SCNC: 9.7 MG/DL (ref 8.6–10.5)
CHLORIDE SERPL-SCNC: 105 MMOL/L (ref 98–107)
CO2 SERPL-SCNC: 22.4 MMOL/L (ref 22–29)
CREAT SERPL-MCNC: 0.88 MG/DL (ref 0.57–1)
DEPRECATED RDW RBC AUTO: 39.1 FL (ref 37–54)
EGFRCR SERPLBLD CKD-EPI 2021: 79.2 ML/MIN/1.73
EOSINOPHIL # BLD AUTO: 0.07 10*3/MM3 (ref 0–0.4)
EOSINOPHIL NFR BLD AUTO: 1.3 % (ref 0.3–6.2)
ERYTHROCYTE [DISTWIDTH] IN BLOOD BY AUTOMATED COUNT: 15 % (ref 12.3–15.4)
GLOBULIN UR ELPH-MCNC: 3.2 GM/DL
GLUCOSE SERPL-MCNC: 119 MG/DL (ref 65–99)
HCT VFR BLD AUTO: 38.6 % (ref 34–46.6)
HGB BLD-MCNC: 12.1 G/DL (ref 12–15.9)
HOLD SPECIMEN: NORMAL
HOLD SPECIMEN: NORMAL
IMM GRANULOCYTES # BLD AUTO: 0.01 10*3/MM3 (ref 0–0.05)
IMM GRANULOCYTES NFR BLD AUTO: 0.2 % (ref 0–0.5)
LARGE PLATELETS: NORMAL
LYMPHOCYTES # BLD AUTO: 2.39 10*3/MM3 (ref 0.7–3.1)
LYMPHOCYTES NFR BLD AUTO: 45.5 % (ref 19.6–45.3)
MAGNESIUM SERPL-MCNC: 1.8 MG/DL (ref 1.6–2.6)
MCH RBC QN AUTO: 22.9 PG (ref 26.6–33)
MCHC RBC AUTO-ENTMCNC: 31.3 G/DL (ref 31.5–35.7)
MCV RBC AUTO: 73 FL (ref 79–97)
MONOCYTES # BLD AUTO: 0.43 10*3/MM3 (ref 0.1–0.9)
MONOCYTES NFR BLD AUTO: 8.2 % (ref 5–12)
NEUTROPHILS NFR BLD AUTO: 2.31 10*3/MM3 (ref 1.7–7)
NEUTROPHILS NFR BLD AUTO: 44 % (ref 42.7–76)
NRBC BLD AUTO-RTO: 0 /100 WBC (ref 0–0.2)
PLATELET # BLD AUTO: 282 10*3/MM3 (ref 140–450)
PMV BLD AUTO: 10.3 FL (ref 6–12)
POIKILOCYTOSIS BLD QL SMEAR: NORMAL
POTASSIUM SERPL-SCNC: 3.5 MMOL/L (ref 3.5–5.2)
PROT SERPL-MCNC: 7.4 G/DL (ref 6–8.5)
RBC # BLD AUTO: 5.29 10*6/MM3 (ref 3.77–5.28)
SODIUM SERPL-SCNC: 141 MMOL/L (ref 136–145)
TROPONIN T SERPL HS-MCNC: <6 NG/L
TSH SERPL DL<=0.05 MIU/L-ACNC: 3.06 UIU/ML (ref 0.27–4.2)
WBC MORPH BLD: NORMAL
WBC NRBC COR # BLD AUTO: 5.25 10*3/MM3 (ref 3.4–10.8)
WHOLE BLOOD HOLD COAG: NORMAL
WHOLE BLOOD HOLD SPECIMEN: NORMAL

## 2024-10-02 PROCEDURE — 80050 GENERAL HEALTH PANEL: CPT | Performed by: EMERGENCY MEDICINE

## 2024-10-02 PROCEDURE — 36415 COLL VENOUS BLD VENIPUNCTURE: CPT

## 2024-10-02 PROCEDURE — 93005 ELECTROCARDIOGRAM TRACING: CPT

## 2024-10-02 PROCEDURE — 93005 ELECTROCARDIOGRAM TRACING: CPT | Performed by: EMERGENCY MEDICINE

## 2024-10-02 PROCEDURE — 93010 ELECTROCARDIOGRAM REPORT: CPT | Performed by: INTERNAL MEDICINE

## 2024-10-02 PROCEDURE — 71045 X-RAY EXAM CHEST 1 VIEW: CPT

## 2024-10-02 PROCEDURE — 99284 EMERGENCY DEPT VISIT MOD MDM: CPT

## 2024-10-02 PROCEDURE — 84484 ASSAY OF TROPONIN QUANT: CPT | Performed by: EMERGENCY MEDICINE

## 2024-10-02 PROCEDURE — 85007 BL SMEAR W/DIFF WBC COUNT: CPT | Performed by: EMERGENCY MEDICINE

## 2024-10-02 PROCEDURE — 83735 ASSAY OF MAGNESIUM: CPT | Performed by: EMERGENCY MEDICINE

## 2024-10-02 RX ORDER — SODIUM CHLORIDE 0.9 % (FLUSH) 0.9 %
10 SYRINGE (ML) INJECTION AS NEEDED
Status: DISCONTINUED | OUTPATIENT
Start: 2024-10-02 | End: 2024-10-02 | Stop reason: HOSPADM

## 2024-10-02 NOTE — ED PROVIDER NOTES
Time: 1:59 AM EDT  Date of encounter:  10/2/2024  Independent Historian/Clinical History and Information was obtained by:   Patient    History is limited by: N/A    Chief Complaint: intermittent palpitations      History of Present Illness:  Patient is a 54 y.o. year old female who presents to the emergency department for evaluation of Intermittent palpitations.  Patient has a history of atrial fibrillation as well as SVT.  She states has been having intermittent episodes over the day.  She states her Cardizem was recently increased but still having these episodes.  Patient does have a pacemaker.  She occasionally gets some chest discomfort.  No other complaints.      Patient Care Team  Primary Care Provider: Ricardo Guaman MD    Past Medical History:     Allergies   Allergen Reactions    Fentanyl Hives    Promethazine Itching and Rash     Past Medical History:   Diagnosis Date    A-fib     Atrial fibrillation with RVR     CHF (congestive heart failure)     DM (diabetes mellitus)     HTN (hypertension)     SVT (supraventricular tachycardia)      Past Surgical History:   Procedure Laterality Date    GALLBLADDER SURGERY      HERNIA REPAIR      HYSTERECTOMY      PACEMAKER IMPLANTATION  2014     History reviewed. No pertinent family history.    Home Medications:  Prior to Admission medications    Medication Sig Start Date End Date Taking? Authorizing Provider   Amylase-Lipase-Protease (CREON 10 PO) Take 10 mg by mouth 2 (Two) Times a Day With Meals.    Capri Dewey MD   atorvastatin (LIPITOR) 20 MG tablet Take 1 tablet by mouth Daily.    ProviderCapri MD   Continuous Glucose Sensor (FreeStyle Anupam 3 Sensor) misc  8/29/24   Capri Dewey MD   dilTIAZem XR (DILACOR XR) 180 MG 24 hr capsule Take 1 capsule by mouth Daily. 8/14/24   Harriet Perez MD   Eliquis 5 MG tablet tablet Take 1 tablet by mouth 2 (Two) Times a Day.    Capri Dewey MD   famotidine (PEPCID) 20 MG tablet Take 1  "tablet by mouth 2 (Two) Times a Day. 7/22/24   Anthony Meyer PA-C   Farxiga 5 MG tablet tablet Take 2 tablets by mouth Daily. 10 mg 8/14/24   Harriet Perez MD   furosemide (LASIX) 20 MG tablet Take 1 tablet by mouth Daily. 9/17/24   Harriet Perez MD   pantoprazole (PROTONIX) 40 MG EC tablet Take 1 tablet by mouth Daily. 4/25/24   Capri Dewey MD   Tirzepatide (MOUNJARO) 5 MG/0.5ML solution pen-injector pen Inject 0.5 mL under the skin into the appropriate area as directed Every 7 (Seven) Days. 8/29/24   ProviderCapri MD        Social History:   Social History     Tobacco Use    Smoking status: Never    Smokeless tobacco: Never   Vaping Use    Vaping status: Never Used   Substance Use Topics    Alcohol use: Never    Drug use: Never         Review of Systems:  Review of Systems   Constitutional:  Negative for chills and fever.   HENT:  Negative for congestion, ear pain and sore throat.    Eyes:  Negative for pain.   Respiratory:  Negative for cough, chest tightness and shortness of breath.    Cardiovascular:  Positive for palpitations. Negative for chest pain.   Gastrointestinal:  Negative for abdominal pain, diarrhea, nausea and vomiting.   Genitourinary:  Negative for flank pain and hematuria.   Musculoskeletal:  Negative for joint swelling.   Skin:  Negative for pallor.   Neurological:  Negative for seizures and headaches.   All other systems reviewed and are negative.       Physical Exam:  /92   Pulse 77   Temp 98.2 °F (36.8 °C) (Oral)   Resp 26   Ht 177.8 cm (70\")   Wt 108 kg (237 lb 10.5 oz)   SpO2 96%   BMI 34.10 kg/m²     Physical Exam  Constitutional:       Appearance: Normal appearance.   HENT:      Head: Normocephalic and atraumatic.      Nose: Nose normal.      Mouth/Throat:      Mouth: Mucous membranes are moist.   Eyes:      Extraocular Movements: Extraocular movements intact.      Conjunctiva/sclera: Conjunctivae normal.      Pupils: Pupils are equal, round, " and reactive to light.   Cardiovascular:      Rate and Rhythm: Normal rate and regular rhythm.      Pulses: Normal pulses.      Heart sounds: Normal heart sounds.   Pulmonary:      Effort: Pulmonary effort is normal.      Breath sounds: Normal breath sounds.   Abdominal:      General: There is no distension.      Palpations: Abdomen is soft.      Tenderness: There is no abdominal tenderness.   Musculoskeletal:         General: Normal range of motion.      Cervical back: Normal range of motion.   Skin:     General: Skin is warm and dry.      Capillary Refill: Capillary refill takes less than 2 seconds.   Neurological:      General: No focal deficit present.      Mental Status: She is alert and oriented to person, place, and time. Mental status is at baseline.   Psychiatric:         Mood and Affect: Mood normal.         Behavior: Behavior normal.                  Procedures:  Procedures      Medical Decision Making:      Comorbidities that affect care:    SVT, Atrial Fibrillation, Congestive Heart Failure, Diabetes, Hypertension    External Notes reviewed:    Previous Clinic Note: Patient seen by cardiology on 9/17/2024 for dizziness, fatigue, chest pain and edema.      The following orders were placed and all results were independently analyzed by me:  Orders Placed This Encounter   Procedures    XR Chest 1 View    [place] Draw    Comprehensive Metabolic Panel    Magnesium    Single High Sensitivity Troponin T    TSH    CBC Auto Differential    Scan Slide    NPO Diet NPO Type: Strict NPO    Undress & Gown    Continuous Pulse Oximetry    Device Interrogation. Device type? Pacemaker    Oxygen Therapy- Nasal Cannula; Titrate 1-6 LPM Per SpO2; 90 - 95%    ECG 12 Lead ED Triage Standing Order; Dysrhythmia    Insert Peripheral IV    CBC & Differential    Green Top (Gel)    Lavender Top    Gold Top - SST    Light Blue Top       Medications Given in the Emergency Department:  Medications   sodium chloride 0.9 % flush 10 mL  (has no administration in time range)        ED Course:    ED Course as of 10/02/24 0230   Wed Oct 02, 2024   0202 ECG 12 Lead ED Triage Standing Order; Dysrhythmia  Sinus rhythm rate of 92.  Nonspecific T wave abnormalities.  No acute ST elevation.  EKG interpreted by me. [LD]      ED Course User Index  [LD] Eloy Crenshaw MD       Labs:    Lab Results (last 24 hours)       Procedure Component Value Units Date/Time    CBC & Differential [[id number]]  (Abnormal) Collected: 10/02/24 0047    Specimen: Blood Updated: 10/02/24 0138    Narrative:      The following orders were created for panel order CBC & Differential.  Procedure                               Abnormality         Status                     ---------                               -----------         ------                     CBC Auto Differential[[id number]]        Abnormal            Final result               Scan Slide[[id number]]                                       Final result                 Please view results for these tests on the individual orders.    Comprehensive Metabolic Panel [[id number]]  (Abnormal) Collected: 10/02/24 0047    Specimen: Blood Updated: 10/02/24 0113     Glucose 119 mg/dL      BUN 13 mg/dL      Creatinine 0.88 mg/dL      Sodium 141 mmol/L      Potassium 3.5 mmol/L      Chloride 105 mmol/L      CO2 22.4 mmol/L      Calcium 9.7 mg/dL      Total Protein 7.4 g/dL      Albumin 4.2 g/dL      ALT (SGPT) 17 U/L      AST (SGOT) 20 U/L      Alkaline Phosphatase 104 U/L      Total Bilirubin 1.2 mg/dL      Globulin 3.2 gm/dL      A/G Ratio 1.3 g/dL      BUN/Creatinine Ratio 14.8     Anion Gap 13.6 mmol/L      eGFR 79.2 mL/min/1.73     Narrative:      GFR Normal >60  Chronic Kidney Disease <60  Kidney Failure <15      Magnesium [[id number]]  (Normal) Collected: 10/02/24 0047    Specimen: Blood Updated: 10/02/24 0113     Magnesium 1.8 mg/dL     Single High Sensitivity Troponin T [[id number]]  (Normal) Collected: 10/02/24 0047     Specimen: Blood Updated: 10/02/24 0120     HS Troponin T <6 ng/L     Narrative:      High Sensitive Troponin T Reference Range:  <14.0 ng/L- Negative Female for AMI  <22.0 ng/L- Negative Male for AMI  >=14 - Abnormal Female indicating possible myocardial injury.  >=22 - Abnormal Male indicating possible myocardial injury.   Clinicians would have to utilize clinical acumen, EKG, Troponin, and serial changes to determine if it is an Acute Myocardial Infarction or myocardial injury due to an underlying chronic condition.         TSH [[id number]]  (Normal) Collected: 10/02/24 0047    Specimen: Blood Updated: 10/02/24 0120     TSH 3.060 uIU/mL     CBC Auto Differential [[id number]]  (Abnormal) Collected: 10/02/24 0047    Specimen: Blood Updated: 10/02/24 0058     WBC 5.25 10*3/mm3      RBC 5.29 10*6/mm3      Hemoglobin 12.1 g/dL      Hematocrit 38.6 %      MCV 73.0 fL      MCH 22.9 pg      MCHC 31.3 g/dL      RDW 15.0 %      RDW-SD 39.1 fl      MPV 10.3 fL      Platelets 282 10*3/mm3      Neutrophil % 44.0 %      Lymphocyte % 45.5 %      Monocyte % 8.2 %      Eosinophil % 1.3 %      Basophil % 0.8 %      Immature Grans % 0.2 %      Neutrophils, Absolute 2.31 10*3/mm3      Lymphocytes, Absolute 2.39 10*3/mm3      Monocytes, Absolute 0.43 10*3/mm3      Eosinophils, Absolute 0.07 10*3/mm3      Basophils, Absolute 0.04 10*3/mm3      Immature Grans, Absolute 0.01 10*3/mm3      nRBC 0.0 /100 WBC     Scan Slide [[id number]] Collected: 10/02/24 0047    Specimen: Blood Updated: 10/02/24 0138     Anisocytosis Slight/1+     Poikilocytes Slight/1+     WBC Morphology Normal     Large Platelets Slight/1+             Imaging:    XR Chest 1 View    Result Date: 10/2/2024  XR CHEST 1 VW Date of Exam: 10/2/2024 12:50 AM EDT Indication: Dysrhythmia Triage Protocol Comparison: 7/22/2024 Findings: Cardiac and mediastinal contours are normal. Left-sided pacer is unchanged. Pulmonary vascularity is normal. The lungs are clear. No pneumothorax.      No acute cardiopulmonary findings. Electronically Signed: Jude Feliz MD  10/2/2024 1:01 AM EDT  Workstation ID: [id number]       Differential Diagnosis and Discussion:    Palpitations: Differential diagnosis includes but is not limited to anxiety, atrioventricular blocks, mitral valve disease, hypoxia, coronary artery disease, hypokalemia, anemia, fever, COPD, congestive heart failure, pericarditis, Sejal-Parkinson-White syndrome, pulmonary embolism, SVT, atrial fibrillation, atrial flutter, sinus tachycardia, thyrotoxicosis, and pheochromocytoma.    All labs were reviewed and interpreted by me.  All X-rays impressions were independently interpreted by me.  EKG was interpreted by me.    MDM  Number of Diagnoses or Management Options  Diagnosis management comments: Patient is afebrile nontoxic-appearing.  Vital signs stable.  At time of evaluation she is lying in bed in no acute distress.  EKG showed sinus rhythm.  Labs show no significant abnormality.  Patient's pacemaker was interrogated report recommended no observation based on current interrogation.  Recommend she follows up closely with her cardiologist.  Discussed return precautions, discharge instructions and answered all her questions.       Amount and/or Complexity of Data Reviewed  Clinical lab tests: reviewed  Tests in the radiology section of CPT®: reviewed  Review and summarize past medical records: yes  Independent visualization of images, tracings, or specimens: yes    Risk of Complications, Morbidity, and/or Mortality  Presenting problems: moderate  Management options: moderate                       Patient Care Considerations:    SEPSIS was considered but is NOT present in the emergency department as SIRS criteria is not present.      Consultants/Shared Management Plan:    None    Social Determinants of Health:    Patient is independent, reliable, and has access to care.       Disposition and Care Coordination:    Discharged: The patient is  suitable and stable for discharge with no need for consideration of admission.    I have explained the patient´s condition, diagnoses and treatment plan based on the information available to me at this time. I have answered questions and addressed any concerns. The patient has a good  understanding of the patient´s diagnosis, condition, and treatment plan as can be expected at this point. The vital signs have been stable. The patient´s condition is stable and appropriate for discharge from the emergency department.      The patient will pursue further outpatient evaluation with the primary care physician or other designated or consulting physician as outlined in the discharge instructions. They are agreeable to this plan of care and follow-up instructions have been explained in detail. The patient has received these instructions in written format and has expressed an understanding of the discharge instructions. The patient is aware that any significant change in condition or worsening of symptoms should prompt an immediate return to this or the closest emergency department or call to 911.  I have explained discharge medications and the need for follow up with the patient/caretakers. This was also printed in the discharge instructions. Patient was discharged with the following medications and follow up:      Medication List      No changes were made to your prescriptions during this visit.      Ricardo Guaman MD  [address]  [place] [address]  [phone]    In 2 days      Harriet Perez MD  [address]  [address]  [place] [address]  [phone]    Schedule an appointment as soon as possible for a visit          Final diagnoses:   Palpitations        ED Disposition       ED Disposition   Discharge    Condition   Stable    Comment   --               This medical record created using voice recognition software.             Eloy Crenshaw MD  10/02/24 [id number]

## 2024-10-03 LAB
QT INTERVAL: 358 MS
QTC INTERVAL: 445 MS

## 2024-10-03 NOTE — TELEPHONE ENCOUNTER
Agree, not a good candidate for treadmill, needs stress MPI or CTA coronary. Agree with scheduling for Stress test

## 2024-10-08 ENCOUNTER — HOSPITAL ENCOUNTER (OUTPATIENT)
Dept: NUCLEAR MEDICINE | Facility: HOSPITAL | Age: 54
Discharge: HOME | End: 2024-10-08
Payer: COMMERCIAL

## 2024-10-08 DIAGNOSIS — R42 DIZZINESS: ICD-10-CM

## 2024-10-08 DIAGNOSIS — R07.89 CHEST PAIN, ATYPICAL: ICD-10-CM

## 2024-10-08 DIAGNOSIS — R60.0 EDEMA LEG: ICD-10-CM

## 2024-10-08 DIAGNOSIS — R53.83 FATIGUE, UNSPECIFIED TYPE: ICD-10-CM

## 2024-10-08 PROCEDURE — 78452 HT MUSCLE IMAGE SPECT MULT: CPT

## 2024-10-08 PROCEDURE — 0 TECHNETIUM TETROFOSMIN KIT: Performed by: INTERNAL MEDICINE

## 2024-10-08 PROCEDURE — 25010000002 REGADENOSON 0.4 MG/5ML SOLUTION: Performed by: INTERNAL MEDICINE

## 2024-10-08 PROCEDURE — 93017 CV STRESS TEST TRACING ONLY: CPT

## 2024-10-08 PROCEDURE — A9502 TC99M TETROFOSMIN: HCPCS | Performed by: INTERNAL MEDICINE

## 2024-10-08 RX ORDER — REGADENOSON 0.08 MG/ML
0.4 INJECTION, SOLUTION INTRAVENOUS
Status: COMPLETED | OUTPATIENT
Start: 2024-10-08 | End: 2024-10-08

## 2024-10-08 RX ADMIN — REGADENOSON 0.4 MG: 0.08 INJECTION, SOLUTION INTRAVENOUS at 09:22

## 2024-10-08 RX ADMIN — TETROFOSMIN 1 DOSE: 1.38 INJECTION, POWDER, LYOPHILIZED, FOR SOLUTION INTRAVENOUS at 08:24

## 2024-10-08 RX ADMIN — TETROFOSMIN 1 DOSE: 1.38 INJECTION, POWDER, LYOPHILIZED, FOR SOLUTION INTRAVENOUS at 09:23

## 2024-10-09 ENCOUNTER — TELEPHONE (OUTPATIENT)
Dept: CARDIOLOGY | Facility: CLINIC | Age: 54
End: 2024-10-09
Payer: COMMERCIAL

## 2024-10-09 LAB
BH CV IMMEDIATE POST TECH DATA BLOOD PRESSURE: NORMAL MMHG
BH CV IMMEDIATE POST TECH DATA HEART RATE: 93 BPM
BH CV IMMEDIATE POST TECH DATA OXYGEN SATS: 96 %
BH CV REST NUCLEAR ISOTOPE DOSE: 9.4 MCI
BH CV SIX MINUTE RECOVERY TECH DATA BLOOD PRESSURE: NORMAL
BH CV SIX MINUTE RECOVERY TECH DATA HEART RATE: 85 BPM
BH CV SIX MINUTE RECOVERY TECH DATA OXYGEN SATURATION: 99 %
BH CV STRESS BP STAGE 1: NORMAL
BH CV STRESS COMMENTS STAGE 1: NORMAL
BH CV STRESS DOSE REGADENOSON STAGE 1: 0.4
BH CV STRESS DURATION MIN STAGE 1: 0
BH CV STRESS DURATION SEC STAGE 1: 10
BH CV STRESS HR STAGE 1: 88
BH CV STRESS NUCLEAR ISOTOPE DOSE: 37.8 MCI
BH CV STRESS O2 STAGE 1: 98
BH CV STRESS PROTOCOL 1: NORMAL
BH CV STRESS RECOVERY BP: NORMAL MMHG
BH CV STRESS RECOVERY HR: 85 BPM
BH CV STRESS RECOVERY O2: 99 %
BH CV STRESS STAGE 1: 1
BH CV THREE MINUTE POST TECH DATA BLOOD PRESSURE: NORMAL MMHG
BH CV THREE MINUTE POST TECH DATA HEART RATE: 83 BPM
BH CV THREE MINUTE POST TECH DATA OXYGEN SATURATION: 99 %
LV EF NUC BP: 49 %
MAXIMAL PREDICTED HEART RATE: 168 BPM
PERCENT MAX PREDICTED HR: 55.36 %
STRESS BASELINE BP: NORMAL MMHG
STRESS BASELINE HR: 70 BPM
STRESS O2 SAT REST: 96 %
STRESS PERCENT HR: 65 %
STRESS POST O2 SAT PEAK: 99 %
STRESS POST PEAK BP: NORMAL MMHG
STRESS POST PEAK HR: 93 BPM
STRESS TARGET HR: 143 BPM

## 2024-10-09 NOTE — TELEPHONE ENCOUNTER
I was notified that patient was having chest pain.  Patient had a stress test yesterday.  On review of the stress test, she has small area of moderate intensity apical and apical anterior reversible defect consistent with ischemia.  I called the patient over the phone to understand the situation.  Patient states she is having chest pain especially on exertion or moving around.  Chest pain gets better on rest.  Chest pain is sharp in nature 6 or 7 in intensity with associated back pain.    I suggested to the patient that if her chest pain is persisting and getting worse with exertion, she should go to the ER for further evaluation with possible left heart cath.  Patient states that she has a court date tomorrow morning which is very important for her and she would not be able to come to the ER no matter how much her chest pain is.  I told the patient that if her chest pain gets worse she should go to the ER immediately, otherwise she should present to the ER as soon as possible.  Patient took her Eliquis, last dose was yesterday night.  I told patient to hold her Eliquis tonight, keep herself n.p.o. if she decides to present to the ER tomorrow with chest pain.  I discussed with the patient possibility of left heart cath if she continues to have chest pain and presents to the ER, likely tomorrow.  Patient voiced understanding.

## 2024-10-09 NOTE — TELEPHONE ENCOUNTER
ESTEFANIA patient. Patient states that she had a stress test yesterday. Patient states she has sharp chest pain that comes and goes with frequent palpitations. Patient states she is short of air with exertion. Denies swelling. Patient is complaint with medication. Advised patient to go to ER if symptoms worsen.

## 2024-10-10 ENCOUNTER — APPOINTMENT (OUTPATIENT)
Dept: GENERAL RADIOLOGY | Facility: HOSPITAL | Age: 54
End: 2024-10-10
Payer: COMMERCIAL

## 2024-10-10 ENCOUNTER — HOSPITAL ENCOUNTER (OUTPATIENT)
Facility: HOSPITAL | Age: 54
Setting detail: OBSERVATION
Discharge: HOME | End: 2024-10-11
Attending: EMERGENCY MEDICINE | Admitting: INTERNAL MEDICINE
Payer: COMMERCIAL

## 2024-10-10 ENCOUNTER — APPOINTMENT (OUTPATIENT)
Dept: CT IMAGING | Facility: HOSPITAL | Age: 54
End: 2024-10-10
Payer: COMMERCIAL

## 2024-10-10 ENCOUNTER — TELEPHONE (OUTPATIENT)
Dept: CARDIOLOGY | Facility: CLINIC | Age: 54
End: 2024-10-10

## 2024-10-10 DIAGNOSIS — R07.9 CHEST PAIN, EXERTIONAL: ICD-10-CM

## 2024-10-10 DIAGNOSIS — I20.0 UNSTABLE ANGINA: Primary | ICD-10-CM

## 2024-10-10 LAB
ALBUMIN SERPL-MCNC: 4.5 G/DL (ref 3.5–5.2)
ALBUMIN/GLOB SERPL: 1.3 G/DL
ALP SERPL-CCNC: 107 U/L (ref 39–117)
ALT SERPL W P-5'-P-CCNC: 17 U/L (ref 1–33)
ANION GAP SERPL CALCULATED.3IONS-SCNC: 9.9 MMOL/L (ref 5–15)
ANISOCYTOSIS BLD QL: NORMAL
APTT PPP: 38 SECONDS (ref 78–95.9)
APTT PPP: 44.3 SECONDS (ref 24.2–34.2)
AST SERPL-CCNC: 21 U/L (ref 1–32)
BASOPHILS # BLD AUTO: 0.04 10*3/MM3 (ref 0–0.2)
BASOPHILS NFR BLD AUTO: 1 % (ref 0–1.5)
BILIRUB SERPL-MCNC: 1.4 MG/DL (ref 0–1.2)
BUN SERPL-MCNC: 10 MG/DL (ref 6–20)
BUN/CREAT SERPL: 11.6 (ref 7–25)
CALCIUM SPEC-SCNC: 9.6 MG/DL (ref 8.6–10.5)
CHLORIDE SERPL-SCNC: 104 MMOL/L (ref 98–107)
CO2 SERPL-SCNC: 27.1 MMOL/L (ref 22–29)
CREAT SERPL-MCNC: 0.86 MG/DL (ref 0.57–1)
DEPRECATED RDW RBC AUTO: 39.3 FL (ref 37–54)
EGFRCR SERPLBLD CKD-EPI 2021: 81.4 ML/MIN/1.73
EOSINOPHIL # BLD AUTO: 0.03 10*3/MM3 (ref 0–0.4)
EOSINOPHIL NFR BLD AUTO: 0.8 % (ref 0.3–6.2)
ERYTHROCYTE [DISTWIDTH] IN BLOOD BY AUTOMATED COUNT: 15 % (ref 12.3–15.4)
GLOBULIN UR ELPH-MCNC: 3.4 GM/DL
GLUCOSE BLDC GLUCOMTR-MCNC: 107 MG/DL (ref 70–99)
GLUCOSE SERPL-MCNC: 110 MG/DL (ref 65–99)
HCT VFR BLD AUTO: 41.1 % (ref 34–46.6)
HGB BLD-MCNC: 12.7 G/DL (ref 12–15.9)
HOLD SPECIMEN: NORMAL
HOLD SPECIMEN: NORMAL
HYPOCHROMIA BLD QL: NORMAL
IMM GRANULOCYTES # BLD AUTO: 0.01 10*3/MM3 (ref 0–0.05)
IMM GRANULOCYTES NFR BLD AUTO: 0.3 % (ref 0–0.5)
INR PPP: 1.02 (ref 0.86–1.15)
INR PPP: 1.07 (ref 0.86–1.15)
LIPASE SERPL-CCNC: 29 U/L (ref 13–60)
LYMPHOCYTES # BLD AUTO: 1.88 10*3/MM3 (ref 0.7–3.1)
LYMPHOCYTES NFR BLD AUTO: 49 % (ref 19.6–45.3)
MAGNESIUM SERPL-MCNC: 1.8 MG/DL (ref 1.6–2.6)
MCH RBC QN AUTO: 22.7 PG (ref 26.6–33)
MCHC RBC AUTO-ENTMCNC: 30.9 G/DL (ref 31.5–35.7)
MCV RBC AUTO: 73.5 FL (ref 79–97)
MONOCYTES # BLD AUTO: 0.23 10*3/MM3 (ref 0.1–0.9)
MONOCYTES NFR BLD AUTO: 6 % (ref 5–12)
NEUTROPHILS NFR BLD AUTO: 1.65 10*3/MM3 (ref 1.7–7)
NEUTROPHILS NFR BLD AUTO: 42.9 % (ref 42.7–76)
NRBC BLD AUTO-RTO: 0 /100 WBC (ref 0–0.2)
NT-PROBNP SERPL-MCNC: 174.9 PG/ML (ref 0–900)
PLATELET # BLD AUTO: 302 10*3/MM3 (ref 140–450)
PMV BLD AUTO: 10.5 FL (ref 6–12)
POTASSIUM SERPL-SCNC: 3.9 MMOL/L (ref 3.5–5.2)
PROT SERPL-MCNC: 7.9 G/DL (ref 6–8.5)
PROTHROMBIN TIME: 13.6 SECONDS (ref 11.8–14.9)
PROTHROMBIN TIME: 14.1 SECONDS (ref 11.8–14.9)
RBC # BLD AUTO: 5.59 10*6/MM3 (ref 3.77–5.28)
SMALL PLATELETS BLD QL SMEAR: ADEQUATE
SODIUM SERPL-SCNC: 141 MMOL/L (ref 136–145)
TROPONIN T SERPL HS-MCNC: <6 NG/L
WBC MORPH BLD: NORMAL
WBC NRBC COR # BLD AUTO: 3.84 10*3/MM3 (ref 3.4–10.8)
WHOLE BLOOD HOLD COAG: NORMAL
WHOLE BLOOD HOLD SPECIMEN: NORMAL

## 2024-10-10 PROCEDURE — 25010000002 HEPARIN (PORCINE) PER 1000 UNITS: Performed by: INTERNAL MEDICINE

## 2024-10-10 PROCEDURE — 93005 ELECTROCARDIOGRAM TRACING: CPT | Performed by: EMERGENCY MEDICINE

## 2024-10-10 PROCEDURE — 93458 L HRT ARTERY/VENTRICLE ANGIO: CPT | Performed by: INTERNAL MEDICINE

## 2024-10-10 PROCEDURE — 84484 ASSAY OF TROPONIN QUANT: CPT | Performed by: EMERGENCY MEDICINE

## 2024-10-10 PROCEDURE — 83735 ASSAY OF MAGNESIUM: CPT | Performed by: EMERGENCY MEDICINE

## 2024-10-10 PROCEDURE — 85730 THROMBOPLASTIN TIME PARTIAL: CPT | Performed by: EMERGENCY MEDICINE

## 2024-10-10 PROCEDURE — G0378 HOSPITAL OBSERVATION PER HR: HCPCS

## 2024-10-10 PROCEDURE — 96375 TX/PRO/DX INJ NEW DRUG ADDON: CPT

## 2024-10-10 PROCEDURE — 25010000002 LIDOCAINE 2% SOLUTION: Performed by: INTERNAL MEDICINE

## 2024-10-10 PROCEDURE — 99285 EMERGENCY DEPT VISIT HI MDM: CPT

## 2024-10-10 PROCEDURE — 85610 PROTHROMBIN TIME: CPT | Performed by: INTERNAL MEDICINE

## 2024-10-10 PROCEDURE — 25010000002 MORPHINE PER 10 MG: Performed by: INTERNAL MEDICINE

## 2024-10-10 PROCEDURE — 85025 COMPLETE CBC W/AUTO DIFF WBC: CPT | Performed by: EMERGENCY MEDICINE

## 2024-10-10 PROCEDURE — 93005 ELECTROCARDIOGRAM TRACING: CPT

## 2024-10-10 PROCEDURE — 25010000002 MORPHINE PER 10 MG: Performed by: EMERGENCY MEDICINE

## 2024-10-10 PROCEDURE — C1894 INTRO/SHEATH, NON-LASER: HCPCS | Performed by: INTERNAL MEDICINE

## 2024-10-10 PROCEDURE — 25510000001 IOPAMIDOL PER 1 ML: Performed by: INTERNAL MEDICINE

## 2024-10-10 PROCEDURE — 83880 ASSAY OF NATRIURETIC PEPTIDE: CPT

## 2024-10-10 PROCEDURE — 99152 MOD SED SAME PHYS/QHP 5/>YRS: CPT | Performed by: INTERNAL MEDICINE

## 2024-10-10 PROCEDURE — 80053 COMPREHEN METABOLIC PANEL: CPT | Performed by: EMERGENCY MEDICINE

## 2024-10-10 PROCEDURE — 25010000002 ONDANSETRON PER 1 MG: Performed by: EMERGENCY MEDICINE

## 2024-10-10 PROCEDURE — 85007 BL SMEAR W/DIFF WBC COUNT: CPT | Performed by: EMERGENCY MEDICINE

## 2024-10-10 PROCEDURE — 25810000003 SODIUM CHLORIDE 0.9 % SOLUTION: Performed by: INTERNAL MEDICINE

## 2024-10-10 PROCEDURE — 25010000002 MIDAZOLAM PER 1MG: Performed by: INTERNAL MEDICINE

## 2024-10-10 PROCEDURE — 25010000002 HEPARIN (PORCINE) 25000-0.45 UT/250ML-% SOLUTION: Performed by: EMERGENCY MEDICINE

## 2024-10-10 PROCEDURE — 74176 CT ABD & PELVIS W/O CONTRAST: CPT

## 2024-10-10 PROCEDURE — C1769 GUIDE WIRE: HCPCS | Performed by: INTERNAL MEDICINE

## 2024-10-10 PROCEDURE — 71045 X-RAY EXAM CHEST 1 VIEW: CPT

## 2024-10-10 PROCEDURE — 99215 OFFICE O/P EST HI 40 MIN: CPT | Performed by: INTERNAL MEDICINE

## 2024-10-10 PROCEDURE — 85730 THROMBOPLASTIN TIME PARTIAL: CPT | Performed by: INTERNAL MEDICINE

## 2024-10-10 PROCEDURE — 99291 CRITICAL CARE FIRST HOUR: CPT

## 2024-10-10 PROCEDURE — 82948 REAGENT STRIP/BLOOD GLUCOSE: CPT

## 2024-10-10 PROCEDURE — 71250 CT THORAX DX C-: CPT

## 2024-10-10 PROCEDURE — 85610 PROTHROMBIN TIME: CPT | Performed by: EMERGENCY MEDICINE

## 2024-10-10 PROCEDURE — 83690 ASSAY OF LIPASE: CPT | Performed by: EMERGENCY MEDICINE

## 2024-10-10 PROCEDURE — 96365 THER/PROPH/DIAG IV INF INIT: CPT

## 2024-10-10 PROCEDURE — 36415 COLL VENOUS BLD VENIPUNCTURE: CPT | Performed by: INTERNAL MEDICINE

## 2024-10-10 PROCEDURE — 93010 ELECTROCARDIOGRAM REPORT: CPT | Performed by: INTERNAL MEDICINE

## 2024-10-10 RX ORDER — ALUMINA, MAGNESIA, AND SIMETHICONE 2400; 2400; 240 MG/30ML; MG/30ML; MG/30ML
15 SUSPENSION ORAL EVERY 6 HOURS PRN
Status: DISCONTINUED | OUTPATIENT
Start: 2024-10-10 | End: 2024-10-11 | Stop reason: HOSPADM

## 2024-10-10 RX ORDER — INSULIN LISPRO 100 [IU]/ML
2-9 INJECTION, SOLUTION INTRAVENOUS; SUBCUTANEOUS
Status: DISCONTINUED | OUTPATIENT
Start: 2024-10-10 | End: 2024-10-11 | Stop reason: HOSPADM

## 2024-10-10 RX ORDER — SODIUM CHLORIDE 9 MG/ML
100 INJECTION, SOLUTION INTRAVENOUS CONTINUOUS
Status: CANCELLED | OUTPATIENT
Start: 2024-10-11 | End: 2024-10-11

## 2024-10-10 RX ORDER — HEPARIN SODIUM 1000 [USP'U]/ML
INJECTION, SOLUTION INTRAVENOUS; SUBCUTANEOUS
Status: DISCONTINUED | OUTPATIENT
Start: 2024-10-10 | End: 2024-10-10 | Stop reason: HOSPADM

## 2024-10-10 RX ORDER — IBUPROFEN 600 MG/1
1 TABLET ORAL
Status: DISCONTINUED | OUTPATIENT
Start: 2024-10-10 | End: 2024-10-11 | Stop reason: HOSPADM

## 2024-10-10 RX ORDER — ASPIRIN 81 MG/1
81 TABLET ORAL DAILY
Status: DISCONTINUED | OUTPATIENT
Start: 2024-10-11 | End: 2024-10-11 | Stop reason: HOSPADM

## 2024-10-10 RX ORDER — DEXTROSE MONOHYDRATE 25 G/50ML
25 INJECTION, SOLUTION INTRAVENOUS
Status: DISCONTINUED | OUTPATIENT
Start: 2024-10-10 | End: 2024-10-11 | Stop reason: HOSPADM

## 2024-10-10 RX ORDER — ATORVASTATIN CALCIUM 10 MG/1
10 TABLET, FILM COATED ORAL DAILY
COMMUNITY

## 2024-10-10 RX ORDER — ONDANSETRON 2 MG/ML
4 INJECTION INTRAMUSCULAR; INTRAVENOUS ONCE
Status: COMPLETED | OUTPATIENT
Start: 2024-10-10 | End: 2024-10-10

## 2024-10-10 RX ORDER — HEPARIN SODIUM 10000 [USP'U]/100ML
10.8 INJECTION, SOLUTION INTRAVENOUS
Status: DISCONTINUED | OUTPATIENT
Start: 2024-10-10 | End: 2024-10-10

## 2024-10-10 RX ORDER — SODIUM CHLORIDE 0.9 % (FLUSH) 0.9 %
10 SYRINGE (ML) INJECTION AS NEEDED
Status: DISCONTINUED | OUTPATIENT
Start: 2024-10-10 | End: 2024-10-11 | Stop reason: HOSPADM

## 2024-10-10 RX ORDER — SODIUM CHLORIDE 0.9 % (FLUSH) 0.9 %
10 SYRINGE (ML) INJECTION EVERY 12 HOURS SCHEDULED
Status: DISCONTINUED | OUTPATIENT
Start: 2024-10-10 | End: 2024-10-11 | Stop reason: HOSPADM

## 2024-10-10 RX ORDER — BISACODYL 10 MG
10 SUPPOSITORY, RECTAL RECTAL DAILY PRN
Status: DISCONTINUED | OUTPATIENT
Start: 2024-10-10 | End: 2024-10-11 | Stop reason: HOSPADM

## 2024-10-10 RX ORDER — IOPAMIDOL 755 MG/ML
INJECTION, SOLUTION INTRAVASCULAR
Status: DISCONTINUED | OUTPATIENT
Start: 2024-10-10 | End: 2024-10-10 | Stop reason: HOSPADM

## 2024-10-10 RX ORDER — NICOTINE POLACRILEX 4 MG
15 LOZENGE BUCCAL
Status: DISCONTINUED | OUTPATIENT
Start: 2024-10-10 | End: 2024-10-11 | Stop reason: HOSPADM

## 2024-10-10 RX ORDER — DAPAGLIFLOZIN 10 MG/1
1 TABLET, FILM COATED ORAL DAILY
COMMUNITY

## 2024-10-10 RX ORDER — MORPHINE SULFATE 2 MG/ML
1 INJECTION, SOLUTION INTRAMUSCULAR; INTRAVENOUS EVERY 4 HOURS PRN
Status: DISCONTINUED | OUTPATIENT
Start: 2024-10-10 | End: 2024-10-11 | Stop reason: HOSPADM

## 2024-10-10 RX ORDER — LORAZEPAM 0.5 MG/1
0.5 TABLET ORAL EVERY 6 HOURS PRN
Status: DISCONTINUED | OUTPATIENT
Start: 2024-10-10 | End: 2024-10-11 | Stop reason: HOSPADM

## 2024-10-10 RX ORDER — ASPIRIN 81 MG/1
324 TABLET, CHEWABLE ORAL ONCE
Status: DISCONTINUED | OUTPATIENT
Start: 2024-10-10 | End: 2024-10-10 | Stop reason: SDUPTHER

## 2024-10-10 RX ORDER — AMOXICILLIN 250 MG
2 CAPSULE ORAL NIGHTLY
Status: DISCONTINUED | OUTPATIENT
Start: 2024-10-10 | End: 2024-10-11 | Stop reason: HOSPADM

## 2024-10-10 RX ORDER — NALOXONE HCL 0.4 MG/ML
0.4 VIAL (ML) INJECTION
Status: DISCONTINUED | OUTPATIENT
Start: 2024-10-10 | End: 2024-10-11 | Stop reason: HOSPADM

## 2024-10-10 RX ORDER — PANCRELIPASE 24000; 76000; 120000 [USP'U]/1; [USP'U]/1; [USP'U]/1
2 CAPSULE, DELAYED RELEASE PELLETS ORAL
COMMUNITY
Start: 2024-09-16

## 2024-10-10 RX ORDER — FUROSEMIDE 20 MG
20 TABLET ORAL DAILY
Status: DISCONTINUED | OUTPATIENT
Start: 2024-10-10 | End: 2024-10-11 | Stop reason: HOSPADM

## 2024-10-10 RX ORDER — ATORVASTATIN CALCIUM 20 MG/1
20 TABLET, FILM COATED ORAL NIGHTLY
Status: DISCONTINUED | OUTPATIENT
Start: 2024-10-10 | End: 2024-10-11 | Stop reason: HOSPADM

## 2024-10-10 RX ORDER — MIDAZOLAM HYDROCHLORIDE 2 MG/2ML
INJECTION, SOLUTION INTRAMUSCULAR; INTRAVENOUS
Status: DISCONTINUED | OUTPATIENT
Start: 2024-10-10 | End: 2024-10-10 | Stop reason: HOSPADM

## 2024-10-10 RX ORDER — BISACODYL 5 MG/1
5 TABLET, DELAYED RELEASE ORAL DAILY PRN
Status: DISCONTINUED | OUTPATIENT
Start: 2024-10-10 | End: 2024-10-11 | Stop reason: HOSPADM

## 2024-10-10 RX ORDER — KETOROLAC TROMETHAMINE 15 MG/ML
15 INJECTION, SOLUTION INTRAMUSCULAR; INTRAVENOUS EVERY 6 HOURS PRN
Status: DISCONTINUED | OUTPATIENT
Start: 2024-10-10 | End: 2024-10-11 | Stop reason: HOSPADM

## 2024-10-10 RX ORDER — ACETAMINOPHEN 325 MG/1
650 TABLET ORAL EVERY 4 HOURS PRN
Status: DISCONTINUED | OUTPATIENT
Start: 2024-10-10 | End: 2024-10-11 | Stop reason: HOSPADM

## 2024-10-10 RX ORDER — POLYETHYLENE GLYCOL 3350 17 G/17G
17 POWDER, FOR SOLUTION ORAL DAILY PRN
Status: DISCONTINUED | OUTPATIENT
Start: 2024-10-10 | End: 2024-10-11 | Stop reason: HOSPADM

## 2024-10-10 RX ORDER — VERAPAMIL HYDROCHLORIDE 2.5 MG/ML
INJECTION, SOLUTION INTRAVENOUS
Status: DISCONTINUED | OUTPATIENT
Start: 2024-10-10 | End: 2024-10-10 | Stop reason: HOSPADM

## 2024-10-10 RX ORDER — ASPIRIN 81 MG/1
324 TABLET, CHEWABLE ORAL ONCE
Status: COMPLETED | OUTPATIENT
Start: 2024-10-10 | End: 2024-10-10

## 2024-10-10 RX ORDER — ATORVASTATIN CALCIUM 10 MG/1
10 TABLET, FILM COATED ORAL DAILY
Status: DISCONTINUED | OUTPATIENT
Start: 2024-10-10 | End: 2024-10-10 | Stop reason: DRUGHIGH

## 2024-10-10 RX ORDER — SODIUM CHLORIDE 9 MG/ML
1 INJECTION, SOLUTION INTRAVENOUS CONTINUOUS
Status: ACTIVE | OUTPATIENT
Start: 2024-10-10 | End: 2024-10-10

## 2024-10-10 RX ORDER — NITROGLYCERIN 0.4 MG/1
0.4 TABLET SUBLINGUAL
Status: DISCONTINUED | OUTPATIENT
Start: 2024-10-10 | End: 2024-10-11 | Stop reason: HOSPADM

## 2024-10-10 RX ORDER — LIDOCAINE HYDROCHLORIDE 20 MG/ML
INJECTION, SOLUTION INFILTRATION; PERINEURAL
Status: DISCONTINUED | OUTPATIENT
Start: 2024-10-10 | End: 2024-10-10 | Stop reason: HOSPADM

## 2024-10-10 RX ORDER — MORPHINE SULFATE 2 MG/ML
INJECTION, SOLUTION INTRAMUSCULAR; INTRAVENOUS
Status: DISCONTINUED | OUTPATIENT
Start: 2024-10-10 | End: 2024-10-10 | Stop reason: HOSPADM

## 2024-10-10 RX ORDER — FAMOTIDINE 20 MG/1
20 TABLET, FILM COATED ORAL 2 TIMES DAILY
Status: DISCONTINUED | OUTPATIENT
Start: 2024-10-10 | End: 2024-10-11 | Stop reason: HOSPADM

## 2024-10-10 RX ORDER — PANTOPRAZOLE SODIUM 40 MG/1
40 TABLET, DELAYED RELEASE ORAL DAILY
Status: DISCONTINUED | OUTPATIENT
Start: 2024-10-10 | End: 2024-10-11 | Stop reason: HOSPADM

## 2024-10-10 RX ORDER — DILTIAZEM HYDROCHLORIDE 180 MG/1
180 CAPSULE, COATED, EXTENDED RELEASE ORAL
Status: DISCONTINUED | OUTPATIENT
Start: 2024-10-11 | End: 2024-10-11 | Stop reason: HOSPADM

## 2024-10-10 RX ADMIN — NITROGLYCERIN 0.5 INCH: 20 OINTMENT TOPICAL at 18:17

## 2024-10-10 RX ADMIN — SODIUM CHLORIDE 1 ML/KG/HR: 9 INJECTION, SOLUTION INTRAVENOUS at 16:49

## 2024-10-10 RX ADMIN — MORPHINE SULFATE 1 MG: 2 INJECTION, SOLUTION INTRAMUSCULAR; INTRAVENOUS at 16:21

## 2024-10-10 RX ADMIN — HEPARIN SODIUM 10.8 UNITS/KG/HR: 10000 INJECTION, SOLUTION INTRAVENOUS at 14:06

## 2024-10-10 RX ADMIN — MORPHINE SULFATE 4 MG: 4 INJECTION, SOLUTION INTRAMUSCULAR; INTRAVENOUS at 14:00

## 2024-10-10 RX ADMIN — ONDANSETRON 4 MG: 2 INJECTION INTRAMUSCULAR; INTRAVENOUS at 13:58

## 2024-10-10 RX ADMIN — ASPIRIN 324 MG: 81 TABLET, CHEWABLE ORAL at 13:35

## 2024-10-10 RX ADMIN — FUROSEMIDE 20 MG: 20 TABLET ORAL at 18:17

## 2024-10-10 NOTE — PLAN OF CARE
Goal Outcome Evaluation:  Plan of Care Reviewed With: patient        Progress: improving  Outcome Evaluation: patient had a heart cath today, tr band removed, pressure dressing to right wrist, no hematoma or bleeding noted, family at bedside, will continue with plan of care

## 2024-10-10 NOTE — ED PROVIDER NOTES
Time: 1:16 PM EDT  Date of encounter:  10/10/2024  Independent Historian/Clinical History and Information was obtained by:   Patient    History is limited by: N/A    Chief Complaint   Patient presents with    Chest Pain         History of Present Illness:  Patient is a 54 y.o. year old female who presents to the emergency department for evaluation of chest pain.  Patient had stress test completed on 10-8-2024.  She states she had been having some chest pain before then but since yesterday has had persistent sharp pain on the left side of her chest that radiates to her back.  She states that she had contacted Dr. Perez who noted concern for possible ischemia on her stress test and thought she may need left heart cath if his symptoms persisted.      She still having chest pressure and heaviness and also some pain in her mid back reported.  The chest pain appears to be worse with exertion.      Patient Care Team  Primary Care Provider: Ricardo Guaman MD    Past Medical History:     Allergies   Allergen Reactions    Fentanyl Hives    Promethazine Itching and Rash     Past Medical History:   Diagnosis Date    A-fib     Atrial fibrillation with RVR     CHF (congestive heart failure)     DM (diabetes mellitus)     HTN (hypertension)     SVT (supraventricular tachycardia)      Past Surgical History:   Procedure Laterality Date    GALLBLADDER SURGERY      HERNIA REPAIR      HYSTERECTOMY      PACEMAKER IMPLANTATION  2014     History reviewed. No pertinent family history.    Home Medications:  Prior to Admission medications    Medication Sig Start Date End Date Taking? Authorizing Provider   Amylase-Lipase-Protease (CREON 10 PO) Take 10 mg by mouth 2 (Two) Times a Day With Meals.    Capri Dewey MD   atorvastatin (LIPITOR) 20 MG tablet Take 1 tablet by mouth Daily.    Capri Dewey MD   Continuous Glucose Sensor (FreeStyle Anupam 3 Sensor) misc  8/29/24   Capri Dewey MD   dilTIAZem XR (DILACOR XR)  "180 MG 24 hr capsule Take 1 capsule by mouth Daily. 8/14/24   Harriet Perez MD   Eliquis 5 MG tablet tablet Take 1 tablet by mouth 2 (Two) Times a Day.    ProviderCapri MD   famotidine (PEPCID) 20 MG tablet Take 1 tablet by mouth 2 (Two) Times a Day. 7/22/24   Anthony Meyer PA-C   Farxiga 5 MG tablet tablet Take 2 tablets by mouth Daily. 10 mg 8/14/24   Harriet Perez MD   furosemide (LASIX) 20 MG tablet Take 1 tablet by mouth Daily. 9/17/24   Harriet Perez MD   pantoprazole (PROTONIX) 40 MG EC tablet Take 1 tablet by mouth Daily. 4/25/24   Capri Dewey MD   Tirzepatide (MOUNJARO) 5 MG/0.5ML solution pen-injector pen Inject 0.5 mL under the skin into the appropriate area as directed Every 7 (Seven) Days. 8/29/24   Capri Dewey MD        Social History:   Social History     Tobacco Use    Smoking status: Never    Smokeless tobacco: Never   Vaping Use    Vaping status: Never Used   Substance Use Topics    Alcohol use: Never    Drug use: Never         Review of Systems:  Review of Systems   Cardiovascular:  Positive for chest pain.   Musculoskeletal:  Positive for back pain.        Physical Exam:  BP (!) 142/101 (BP Location: Left arm, Patient Position: Sitting)   Pulse 73   Temp 97.8 °F (36.6 °C) (Oral)   Resp 20   Ht 177.8 cm (70\")   Wt 111 kg (243 lb 13.3 oz)   SpO2 100%   BMI 34.99 kg/m²       General: Awake alert and in mild distress    HEENT: Head normocephalic atraumatic, eyes PERRLA EOMI, nose normal, oropharynx normal.    Neck: Supple full range of motion, no meningismus, no lymphadenopathy    Heart: Regular rate and rhythm, no murmurs or rubs, 2+ radial pulses bilaterally    Lungs: Clear to auscultation bilaterally without wheezes or crackles, no respiratory distress    Abdomen: Soft, nontender, nondistended, no rebound or guarding    Skin: Warm, dry, no rash    Musculoskeletal: Normal range of motion, no lower extremity edema or calf " tenderness    Neurologic: Oriented x3, no motor deficits no sensory deficits    Psychiatric: Mood appears stable, no psychosis              Procedures:  Procedures      Medical Decision Making:      Comorbidities that affect care:    Coronary Artery Disease    External Notes reviewed:    Previous Radiological Studies: I reviewed her most recent nuclear medicine stress test from 2 days ago that showed a small apical area of reversible myocardial ischemia      The following orders were placed and all results were independently analyzed by me:  Orders Placed This Encounter   Procedures    XR Chest 1 View    [place] Draw    High Sensitivity Troponin T    Comprehensive Metabolic Panel    Lipase    BNP    Magnesium    CBC Auto Differential    Scan Slide    High Sensitivity Troponin T 2Hr    Protime-INR    aPTT    aPTT    NPO Diet NPO Type: Strict NPO    Undress & Gown    Continuous Pulse Oximetry    Notify Provider Platelet Count Less Than 79871    Notify Provider if PTT Not in Therapeutic Range After 24 Hours    Stop Infusion & Notify Provider if Bleeding Occurs    RN To Release aPTT Order 6 Hours After Heparin Bolus & 6 Hours After Any Heparin Rate Change    After 2 Consecutive Therapeutic aPTTs, Obtain aPTT Daily.  If a Rate Adjustment is Necessary, Resume Every 6 Hour aPTT Draws    Cardiology (on-call MD unless specified)    Internal Medicine (on-call MD unless specified)    Oxygen Therapy- Nasal Cannula; Titrate 1-6 LPM Per SpO2; 90 - 95%    ECG 12 Lead ED Triage Standing Order; Chest Pain    ECG 12 Lead ED Triage Standing Order; Chest Pain    Insert Peripheral IV    Initiate Observation Status    CBC & Differential    Green Top (Gel)    Lavender Top    Gold Top - SST    Light Blue Top    CBC & Differential       Medications Given in the Emergency Department:  Medications   sodium chloride 0.9 % flush 10 mL (has no administration in time range)   heparin bolus from bag solution 5,000 Units (has no administration in  time range)   heparin 31944 units/250 mL (100 units/mL) in 0.45 % NaCl infusion (has no administration in time range)   heparin bolus from bag solution 4,000 Units (has no administration in time range)   heparin bolus from bag solution 2,000 Units (has no administration in time range)   morphine injection 4 mg (has no administration in time range)   ondansetron (ZOFRAN) injection 4 mg (has no administration in time range)   aspirin chewable tablet 324 mg (324 mg Oral Given 10/10/24 1335)        ED Course:    The patient was initially evaluated in the triage area where orders were placed. The patient was later dispositioned by Cruz Fuentes MD.      The patient was advised to stay for completion of workup which includes but is not limited to communication of labs and radiological results, reassessment and plan. The patient was advised that leaving prior to disposition by a provider could result in critical findings that are not communicated to the patient.     ED Course as of 10/10/24 1354   Thu Oct 10, 2024   1317 PROVIDER IN TRIAGE  Patient was evaluated by me in triage, Kameron Arthur PA-C.  Orders were placed and patient is currently awaiting final results and disposition.  [MD]   1322 EKG: I interpreted her twelve-lead EKG as normal sinus rhythm at 80 bpm, normal P waves, QRS showing anteroseptal Q waves, no ST elevation, borderline T wave abnormalities inferior leads. [VS]      ED Course User Index  [MD] Kameron Arthur PA-C  [VS] Cruz Fuentes MD       Labs:    Lab Results (last 24 hours)       Procedure Component Value Units Date/Time    High Sensitivity Troponin T [[id number]]  (Normal) Collected: 10/10/24 1250    Specimen: Blood Updated: 10/10/24 1320     HS Troponin T <6 ng/L     Narrative:      High Sensitive Troponin T Reference Range:  <14.0 ng/L- Negative Female for AMI  <22.0 ng/L- Negative Male for AMI  >=14 - Abnormal Female indicating possible myocardial injury.  >=22 - Abnormal Male  indicating possible myocardial injury.   Clinicians would have to utilize clinical acumen, EKG, Troponin, and serial changes to determine if it is an Acute Myocardial Infarction or myocardial injury due to an underlying chronic condition.         CBC & Differential [[id number]]  (Abnormal) Collected: 10/10/24 1250    Specimen: Blood Updated: 10/10/24 1330    Narrative:      The following orders were created for panel order CBC & Differential.  Procedure                               Abnormality         Status                     ---------                               -----------         ------                     CBC Auto Differential[[id number]]        Abnormal            Final result               Scan Slide[[id number]]                                       In process                   Please view results for these tests on the individual orders.    Comprehensive Metabolic Panel [[id number]]  (Abnormal) Collected: 10/10/24 1250    Specimen: Blood Updated: 10/10/24 1320     Glucose 110 mg/dL      BUN 10 mg/dL      Creatinine 0.86 mg/dL      Sodium 141 mmol/L      Potassium 3.9 mmol/L      Chloride 104 mmol/L      CO2 27.1 mmol/L      Calcium 9.6 mg/dL      Total Protein 7.9 g/dL      Albumin 4.5 g/dL      ALT (SGPT) 17 U/L      AST (SGOT) 21 U/L      Alkaline Phosphatase 107 U/L      Total Bilirubin 1.4 mg/dL      Globulin 3.4 gm/dL      A/G Ratio 1.3 g/dL      BUN/Creatinine Ratio 11.6     Anion Gap 9.9 mmol/L      eGFR 81.4 mL/min/1.73     Narrative:      GFR Normal >60  Chronic Kidney Disease <60  Kidney Failure <15      Lipase [[id number]]  (Normal) Collected: 10/10/24 1250    Specimen: Blood Updated: 10/10/24 1320     Lipase 29 U/L     BNP [[id number]]  (Normal) Collected: 10/10/24 1250    Specimen: Blood Updated: 10/10/24 1318     proBNP 174.9 pg/mL     Narrative:      This assay is used as an aid in the diagnosis of individuals suspected of having heart failure. It can be used as an aid in the diagnosis of acute  decompensated heart failure (ADHF) in patients presenting with signs and symptoms of ADHF to the emergency department (ED). In addition, NT-proBNP of <300 pg/mL indicates ADHF is not likely.    Age Range Result Interpretation  NT-proBNP Concentration (pg/mL:      <50             Positive            >450                   Gray                 300-450                    Negative             <300    50-75           Positive            >900                  Gray                300-900                  Negative            <300      >75             Positive            >1800                  Gray                300-1800                  Negative            <300    Magnesium [[id number]]  (Normal) Collected: 10/10/24 1250    Specimen: Blood Updated: 10/10/24 1320     Magnesium 1.8 mg/dL     CBC Auto Differential [[id number]]  (Abnormal) Collected: 10/10/24 1250    Specimen: Blood Updated: 10/10/24 1330     WBC 3.84 10*3/mm3      RBC 5.59 10*6/mm3      Hemoglobin 12.7 g/dL      Hematocrit 41.1 %      MCV 73.5 fL      MCH 22.7 pg      MCHC 30.9 g/dL      RDW 15.0 %      RDW-SD 39.3 fl      MPV 10.5 fL      Platelets 302 10*3/mm3      Neutrophil % 42.9 %      Lymphocyte % 49.0 %      Monocyte % 6.0 %      Eosinophil % 0.8 %      Basophil % 1.0 %      Immature Grans % 0.3 %      Neutrophils, Absolute 1.65 10*3/mm3      Lymphocytes, Absolute 1.88 10*3/mm3      Monocytes, Absolute 0.23 10*3/mm3      Eosinophils, Absolute 0.03 10*3/mm3      Basophils, Absolute 0.04 10*3/mm3      Immature Grans, Absolute 0.01 10*3/mm3      nRBC 0.0 /100 WBC     Scan Slide [[id number]] Collected: 10/10/24 1250    Specimen: Blood Updated: 10/10/24 1259    Protime-INR [[id number]] Collected: 10/10/24 1250    Specimen: Blood Updated: 10/10/24 1350    aPTT [[id number]] Collected: 10/10/24 1250    Specimen: Blood Updated: 10/10/24 1350             Imaging:    XR Chest 1 View    Result Date: 10/10/2024  XR CHEST 1 VW Date of Exam: 10/10/2024 1:24 PM EDT  Indication: Chest Pain Triage Protocol Comparison: Chest radiograph dated 10/2/2024 Findings: There is a right chest wall 2-lead pacemaker with leads in expected position. The cardiomediastinal silhouette is unchanged. Pulmonary vascularity appears normal. There is no focal airspace consolidation, pleural effusion, or pneumothorax. There are degenerative changes of the thoracic spine.     Impression: 1. No acute cardiopulmonary abnormality. Electronically Signed: Alli Justus  10/10/2024 1:51 PM EDT  Workstation ID: [id number]       Differential Diagnosis and Discussion:      Chest Pain:  Based on the patient's signs and symptoms, I considered aortic dissection, myocardial infaction, pulmonary embolism, cardiac tamponade, pericarditis, pneumothorax, musculoskeletal chest pain and other differential diagnosis as an etiology of the patient's chest pain.     All labs were reviewed and interpreted by me.  All X-rays impressions were independently interpreted by me.  EKG was interpreted by me.    MDM     Amount and/or Complexity of Data Reviewed  Clinical lab tests: reviewed  Tests in the medicine section of CPT®: reviewed           This patient is a 54-year-old female presenting with chest heaviness and pressure that seems to be exertional in nature this week and also some constant mid or upper back pain.    It sounds like she is already followed by cardiology and just had an abnormal cardiac stress test 2 days ago showing a reversible area of ischemia.    Given her continued chest pressure we checked blood work including heart enzymes came back unremarkable, and EKG shows no overt ischemia but some Q waves in the anteroseptal leads and some nonspecific T wave abnormalities in the inferior leads.    We are giving her full-strength aspirin here and I will start heparin drip for ACS (unstable angina) and will have medicine service admit her to a monitored bed and I have consulted with her cardiologist, who plans to  perform cardiac cath.            Total Critical Care time of 30 minutes. Total critical care time documented does not include time spent on separately billed procedures for services of nurses or physician assistants. I personally saw and examined the patient. I have reviewed all diagnostic interpretations and treatment plans as written. I was present for the key portions of any procedures performed and the inclusive time noted in any critical care statement. Critical care time includes patient management by me, time spent at the patients bedside,  time to review lab and imaging results, discussing patient care, documentation in the medical record, and time spent with family or caregiver.          Patient Care Considerations:    Considerations: I considered ordering CT chest with IV contrast but I have low suspicion for acute PE or dissection today, and she just had a negative CT of the chest a couple months ago with no signs of thoracic aortic aneurysm or dissection or PE, and I am trying to avoid IV contrast used as her cardiologist is going to take her to Cath Lab likely today for unstable angina.        Consultants/Shared Management Plan:    Consultant: I have discussed the case with Dr. Perez of cardiology who agrees to consult on the patient.  PCP: I have discussed the case with on-call PCP who agrees to accept the patient for admission.    Social Determinants of Health:    Patient is independent, reliable, and has access to care.       Disposition and Care Coordination:    Admit:   Through independent evaluation of the patient's history, physical, and imperical data, the patient meets criteria for inpatient admission to the hospital.        Final diagnoses:   Unstable angina   Chest pain, exertional        ED Disposition       ED Disposition   Decision to Admit    Condition   --    Comment   Level of Care: Progressive Care [20]   Diagnosis: Chest pain [032943]   Admitting Physician: PACO COURTNEY [[id number]]    Attending Physician: PACO COURTNEY [[id number]]                 This medical record created using voice recognition software.             Cruz Fuentes MD  10/10/24 [id number]       Cruz Fuentes MD  10/10/24 6878

## 2024-10-10 NOTE — TELEPHONE ENCOUNTER
Caller: Bisi Gutierrez    Relationship: Self    Best call back number: [phone]    What is the best time to reach you: ANY    Who are you requesting to speak with (clinical staff, provider,  specific staff member): ANY    What was the call regarding: PATIENT IS NOTIFYING DR. RIZZO SHE IS IN THE ER AT [facility]

## 2024-10-10 NOTE — CONSULTS
[facility]   INTERVENTIONAL CARDIOLOGY CONSULT NOTE    Patient Name: Bisi Gutierrez  : 1970  MRN: [id number]    Primary Care Physician:  Ricardo Guaman MD  Date of admission: 10/10/2024    Subjective   Subjective     Chief Complaint: Chest pain/back pain    HPI:  Bisi Gutierrez is a 54 y.o. female with past medical history of tachybradycardia syndrome status post dual-chamber pacemaker implant 14, paroxysmal atrial fibrillation status post 2 ablation on Eliquis 5 mg twice daily and Cardizem 180 mg daily, nonsustained SVT, who presented to the ER for substernal chest pain, moderate in severity, radiating to neck and back.  She had recent stress test that showed apical and apical anterior reversible defect.  Patient currently complains of worsening back pain more than the chest pain.  Initial troponin negative.  EKG shows sinus rhythm with old anterior infarct.  Last dose of Eliquis was stable for yesterday evening.    Review of Systems  Negative except as mentioned in HPI above.    Personal History     Past Medical History:   Diagnosis Date    A-fib     Atrial fibrillation with RVR     CHF (congestive heart failure)     DM (diabetes mellitus)     HTN (hypertension)     SVT (supraventricular tachycardia)        Past Surgical History:   Procedure Laterality Date    GALLBLADDER SURGERY      HERNIA REPAIR      HYSTERECTOMY      PACEMAKER IMPLANTATION         Family History: family history is not on file. Otherwise pertinent FHx was reviewed and not pertinent to current issue.    Social History:  reports that she has never smoked. She has never used smokeless tobacco. She reports that she does not drink alcohol and does not use drugs.    Home Medications:  Tirzepatide, apixaban, atorvastatin, dapagliflozin Propanediol, dilTIAZem XR, famotidine, furosemide, pancrelipase (Lip-Prot-Amyl), and pantoprazole    Allergies:  Allergies   Allergen Reactions    Fentanyl Hives    Promethazine Itching and Rash        Objective   Objective     Vitals:   Temp:  [97.8 °F (36.6 °C)] 97.8 °F (36.6 °C)  Heart Rate:  [69-73] 69  Resp:  [20] 20  BP: (140-142)/() 140/90  Physical Exam    Constitutional: Awake, alert   Eyes: PERRLA, sclerae anicteric, no conjunctival injection   HENT: NCAT, mucous membranes moist   Neck: Supple, no thyromegaly, no lymphadenopathy, trachea midline, No JVD, No carotid bruit   Respiratory: Clear to auscultation bilaterally, nonlabored respirations    Cardiovascular: RRR, no murmurs, rubs, or gallops appreciated, palpable pedal pulses bilaterally   Gastrointestinal: Positive bowel sounds, soft, nontender, nondistended   Musculoskeletal: No ankle edema, no clubbing or cyanosis to extremities   Psychiatric: Appropriate affect, cooperative   Neurologic: Oriented x 3, strength symmetric in all extremities, Cranial Nerves grossly intact, speech is clear   Skin: Warm, Dry, No rashes, No palor    Result Review    Result Review:  I have personally reviewed the results from the time of this admission to 10/10/2024 14:27 EDT and agree with these findings:  [x]  Laboratory  []  Microbiology  [x]  Radiology  [x]  EKG/Telemetry   [x]  Cardiology/Vascular   []  Pathology  [x]  Old records  []  Other:      ECG 12 Lead ED Triage Standing Order; Chest Pain   Preliminary Result   HEART RATE=80  bpm   RR Goygydqq=884  ms   MO Bqdlprek=905  ms   P Horizontal Axis=14  deg   P Front Axis=42  deg   QRSD Pvxonsvb=473  ms   QT Idzpfnnv=862  ms   MFbN=439  ms   QRS Axis=-2  deg   T Wave Axis=18  deg   - ABNORMAL ECG -   Sinus rhythm   Consider anteroseptal infarct   Borderline T abnormalities, inferior leads   Date and Time of Study:2024-10-10 12:19:48      ECG 12 Lead ED Triage Standing Order; Chest Pain    (Results Pending)       No results found for this or any previous visit.    Lab Results   Component Value Date    CKTOTAL 148 07/09/2023    CKMB 2.1 07/09/2023    TROPONINI <0.012 03/15/2022    TROPONINT <6 10/10/2024          SMITHA SCORE  SMITHA Cardiac Risk Assessment:    1. 65 Years of age or older?        No  2. Risk factors for CAD 3 or greater?       Yes (1 Point)  3. Known CAD (> or = 50%)?        No  4. ASA used in past 7 days?        No  5. Severe Angina (> or = 2 episodes w/in 24 hours)?   Yes (1 Point)   6. ST changes > or = 0.5 mm?       No  7. Positive Cardiac Markers?        No        Total - 2 Points - 8.3% Risk    ASCVD SCORE  The ASCVD Risk score (Nick SANTOS, et al., 2019) failed to calculate for the following reasons:    The valid total cholesterol range is 130 to 320 mg/dL        Assessment & Plan   Assessment / Plan         Active Hospital Problems:  Active Hospital Problems    Diagnosis     **Chest pain     Unstable angina    Hypertension  Hyperlipidemia baseline atrial fibrillation on Eliquis  Tachybradycardia syndrome status post dual-chamber pacemaker  Abnormal stress test    Plan:   -Patient with abnormal stress test and worsening chest pain on exertion, concerning for unstable angina, will plan for left heart cath today.  Patient is n.p.o. since yesterday.  Patient states last dose of Eliquis was day before yesterday.  Right radial artery access.  - Patient was started on heparin drip in the ER.  - Further plan post left heart cath.      Addendum at 3:45 PM after left heart cath  - Left heart cath showed no significant coronary disease other than mild luminal irregularities.  Continue moderate intensity statin, calcium channel blocker, NOAC for paroxysmal A-fib.  Evaluate for noncardiac cause of chest pain.  - Aggressive risk factor modification include blood pressure control and diabetes control.  - Post discharge, patient can follow-up with cardiology clinic in 6 to 8 weeks.    Will be available as needed.  Thank you for the consult.      CODE STATUS:          Harriet Perez MD, [facility]   Interventional Cardiology      I spent 45 minutes caring for this patient on this date of service. This time includes  time spent by me in the following activities:preparing for the visit, reviewing tests, obtaining and/or reviewing a separately obtained history, performing a medically appropriate examination and/or evaluation , counseling and educating the patient/family/caregiver, ordering medications, tests, or procedures, referring and communicating with other health care professionals , documenting information in the medical record, independently interpreting results and communicating that information with the patient/family/caregiver, and care coordination.     This is an acute or chronic illness that poses a threat to life or bodily function. The above treatment plan involves a high risk of complications and/or mortality of patient management.    The patient was seen and examined. Work by the provider also included review and/or ordering of lab tests, review and/or ordering of radiology tests, review and/or ordering of medicine tests, discussion with other physicians or providers, independent review of data, obtaining old records, review/summation of old records, and/or other review.    I have reviewed the family history, social history, and past medical history for this patient. Previous information and data has been reviewed and updated as needed. I have reviewed and verified the chief complaint, history, and other documentation. The patient was interviewed and examined in the clinic and the chart reviewed. The previous observations, recommendations, and conclusions were reviewed including those of other providers.     The plan was discussed with the patient and/or family. The patient was given time to ask questions and these questions were answered. At the conclusion of their visit they had no additional questions or concerns and all questions were answered to their satisfaction.     Patient was given instructions and counseling regarding her condition or for health maintenance advice.

## 2024-10-10 NOTE — Clinical Note
The left ventricle was injected and visualized. Rate = 8 mL/sec. Total volume = 20 mL. At 600 PSI. Verified patient via 2 identifiers:    Reviewed her Calcium score today. She denied having diabetes and smoking though did say she has had episodes of feeling shortness of breath. She has a cardiologist and requested the results be faxed to Dr. Alice Arroyo and her PCP Dr. Celsa Franco. Faxed was sent.     She stated she has an appointment with the cardiologist.

## 2024-10-10 NOTE — H&P
[facility]   HISTORY AND PHYSICAL    Patient Name: Bisi Gutierrez  : 1970  MRN: [id number]  Primary Care Physician:  Ricardo Guaman MD  Date of admission: 10/10/2024    Subjective   Subjective     Chief Complaint:   Chest pain      HPI:    Bisi Gutierrez is a 54 y.o. female with history of diabetes and multiple other medical problems including fatty liver and chronic abdominal issues, came to ER with chest pain, patient had a recent stress test which was borderline positive according to cardiologist Dr. Olsen.  Patient was seen in ER and ER physician notified cardiologist who recommended admission for unstable angina.  Patient admitted by me, cardiac cath performed which was negative.  Patient still complains of chest wall pain and back pain and nonspecific complaints.  No associated nausea vomiting.        Review of Systems:    No fever chills,   No shortness of breath.  No cough no coughing up blood no diarrhea no blood in the stools no black-colored stools.  Middle-age obese female not in acute distress.  Heart regular    Personal History     Past Medical History:   Diagnosis Date    A-fib     Atrial fibrillation with RVR     CHF (congestive heart failure)     DM (diabetes mellitus)     HTN (hypertension)     SVT (supraventricular tachycardia)        Past Surgical History:   Procedure Laterality Date    GALLBLADDER SURGERY      HERNIA REPAIR      HYSTERECTOMY      PACEMAKER IMPLANTATION         Family History: family history is not on file. Otherwise pertinent FHx was reviewed and not pertinent to current issue.    Social History:  reports that she has never smoked. She has never used smokeless tobacco. She reports that she does not drink alcohol and does not use drugs.    Home Medications:  Tirzepatide, apixaban, atorvastatin, dapagliflozin Propanediol, dilTIAZem XR, famotidine, furosemide, pancrelipase (Lip-Prot-Amyl), and pantoprazole      Allergies:  Allergies   Allergen Reactions     Fentanyl Hives    Promethazine Itching and Rash       Objective   Objective     Vitals:   Temp:  [97.8 °F (36.6 °C)-98.1 °F (36.7 °C)] 98.1 °F (36.7 °C)  Heart Rate:  [69-85] 74  Resp:  [18-20] 18  BP: (124-142)/() 140/97  Flow (L/min):  [2] 2    Physical Exam    Middle-aged female not in acute distress but tired looking.  Neck supple without any adenopathy  .  Lungs clear.  Abdomen obese and soft.  Minimal chest wall tenderness.  Tenderness on the back and L-spine and paraspinal area as well as some upper chest wall tenderness.      I have personally reviewed the results from the time of this admission to 10/10/2024 19:51 EDT and agree with these findings:  [x]  Laboratory  [x]  Microbiology  [x]  Radiology  []  EKG/Telemetry   []  Cardiology/Vascular   []  Pathology  []  Old records  []  Other:    CBC:    WBC   Date Value Ref Range Status   10/10/2024 3.84 3.40 - 10.80 10*3/mm3 Final     RBC   Date Value Ref Range Status   10/10/2024 5.59 (H) 3.77 - 5.28 10*6/mm3 Final     Hemoglobin   Date Value Ref Range Status   10/10/2024 12.7 12.0 - 15.9 g/dL Final     Hematocrit   Date Value Ref Range Status   10/10/2024 41.1 34.0 - 46.6 % Final     MCV   Date Value Ref Range Status   10/10/2024 73.5 (L) 79.0 - 97.0 fL Final     MCH   Date Value Ref Range Status   10/10/2024 22.7 (L) 26.6 - 33.0 pg Final     MCHC   Date Value Ref Range Status   10/10/2024 30.9 (L) 31.5 - 35.7 g/dL Final     RDW   Date Value Ref Range Status   10/10/2024 15.0 12.3 - 15.4 % Final     RDW-SD   Date Value Ref Range Status   10/10/2024 39.3 37.0 - 54.0 fl Final     MPV   Date Value Ref Range Status   10/10/2024 10.5 6.0 - 12.0 fL Final     Platelets   Date Value Ref Range Status   10/10/2024 302 140 - 450 10*3/mm3 Final     Neutrophil %   Date Value Ref Range Status   10/10/2024 42.9 42.7 - 76.0 % Final     Lymphocyte %   Date Value Ref Range Status   10/10/2024 49.0 (H) 19.6 - 45.3 % Final     Monocyte %   Date Value Ref Range Status    10/10/2024 6.0 5.0 - 12.0 % Final     Eosinophil %   Date Value Ref Range Status   10/10/2024 0.8 0.3 - 6.2 % Final     Basophil %   Date Value Ref Range Status   10/10/2024 1.0 0.0 - 1.5 % Final     Immature Grans %   Date Value Ref Range Status   10/10/2024 0.3 0.0 - 0.5 % Final     Neutrophils, Absolute   Date Value Ref Range Status   10/10/2024 1.65 (L) 1.70 - 7.00 10*3/mm3 Final     Lymphocytes, Absolute   Date Value Ref Range Status   10/10/2024 1.88 0.70 - 3.10 10*3/mm3 Final     Monocytes, Absolute   Date Value Ref Range Status   10/10/2024 0.23 0.10 - 0.90 10*3/mm3 Final     Eosinophils, Absolute   Date Value Ref Range Status   10/10/2024 0.03 0.00 - 0.40 10*3/mm3 Final     Basophils, Absolute   Date Value Ref Range Status   10/10/2024 0.04 0.00 - 0.20 10*3/mm3 Final     Immature Grans, Absolute   Date Value Ref Range Status   10/10/2024 0.01 0.00 - 0.05 10*3/mm3 Final     nRBC   Date Value Ref Range Status   10/10/2024 0.0 0.0 - 0.2 /100 WBC Final        BMP:    Lab Results   Component Value Date    GLUCOSE 110 (H) 10/10/2024    BUN 10 10/10/2024    CREATININE 0.86 10/10/2024    BCR 11.6 10/10/2024    K 3.9 10/10/2024    CO2 27.1 10/10/2024    CALCIUM 9.6 10/10/2024    ALBUMIN 4.5 10/10/2024    AST 21 10/10/2024    ALT 17 10/10/2024        XR Chest 1 View    Result Date: 10/10/2024  Impression: 1. No acute cardiopulmonary abnormality. Electronically Signed: Alli Justus  10/10/2024 1:51 PM EDT  Workstation ID: [id number]            Assessment & Plan   Assessment / Plan       Current Diagnosis:  Active Hospital Problems    Diagnosis     **Chest pain     DM (diabetes mellitus)     Paroxysmal atrial fibrillation      Plan:   Patient admitted to hospital per ER and cardiologist request.  Ruled out for cardiac issues.  Cardiac cath negative.  At this point we will proceed with CT chest noncontrast and CT abdomen pelvis to rule out other etiologies.  I personally feel patient's pain musculoskeletal and could  be GI from her fatty liver.  At this point we will use Toradol as needed for pain.    Restart essential meds.  Monitor sugars and use sliding scale.  Repeat labs in a.m..  Further management based on clinical course.      VTE Prophylaxis:  Pharmacologic VTE prophylaxis orders are present.        GI Prophylaxis:       Pepcid/PPI showed    CODE STATUS:    Code Status (Patient has no pulse and is not breathing): CPR (Attempt to Resuscitate)  Medical Interventions (Patient has pulse or is breathing): Full Support    Admission Status:  I believe this patient meets observation status.             I have dictated this note utilizing Dragon Dictation.             Please note that portions of this note were completed with a voice recognition program.             Part of this note may be an electronic transcription/translation of spoken language to printed text         using the Dragon Dictation System.       Electronically signed by Clark Middleton MD, 10/10/24, 7:48 PM EDT.    Total time spent with in evaluation and management: 63 minutes discussing with ER physician reviewing records and discussing with the PCP and reviewing consultants notes and discussing with nursing staff, ordering test and reviewing results.

## 2024-10-11 ENCOUNTER — READMISSION MANAGEMENT (OUTPATIENT)
Dept: CALL CENTER | Facility: HOSPITAL | Age: 54
End: 2024-10-11
Payer: COMMERCIAL

## 2024-10-11 VITALS
RESPIRATION RATE: 16 BRPM | BODY MASS INDEX: 34.91 KG/M2 | WEIGHT: 243.83 LBS | TEMPERATURE: 98.6 F | HEART RATE: 76 BPM | SYSTOLIC BLOOD PRESSURE: 122 MMHG | DIASTOLIC BLOOD PRESSURE: 84 MMHG | HEIGHT: 70 IN | OXYGEN SATURATION: 97 %

## 2024-10-11 PROBLEM — R07.9 CHEST PAIN: Status: RESOLVED | Noted: 2024-10-10 | Resolved: 2024-10-11

## 2024-10-11 PROBLEM — N20.0 NEPHROLITHIASIS: Chronic | Status: ACTIVE | Noted: 2024-10-11

## 2024-10-11 LAB
ANION GAP SERPL CALCULATED.3IONS-SCNC: 9 MMOL/L (ref 5–15)
BUN SERPL-MCNC: 11 MG/DL (ref 6–20)
BUN/CREAT SERPL: 14.9 (ref 7–25)
CALCIUM SPEC-SCNC: 9 MG/DL (ref 8.6–10.5)
CHLORIDE SERPL-SCNC: 107 MMOL/L (ref 98–107)
CHOLEST SERPL-MCNC: 91 MG/DL (ref 0–200)
CO2 SERPL-SCNC: 27 MMOL/L (ref 22–29)
CREAT SERPL-MCNC: 0.74 MG/DL (ref 0.57–1)
DEPRECATED RDW RBC AUTO: 39.8 FL (ref 37–54)
EGFRCR SERPLBLD CKD-EPI 2021: 97.5 ML/MIN/1.73
ERYTHROCYTE [DISTWIDTH] IN BLOOD BY AUTOMATED COUNT: 15 % (ref 12.3–15.4)
GLUCOSE BLDC GLUCOMTR-MCNC: 114 MG/DL (ref 70–99)
GLUCOSE SERPL-MCNC: 107 MG/DL (ref 65–99)
HBA1C MFR BLD: 5.6 % (ref 4.8–5.6)
HCT VFR BLD AUTO: 37.3 % (ref 34–46.6)
HDLC SERPL-MCNC: 38 MG/DL (ref 40–60)
HGB BLD-MCNC: 11.5 G/DL (ref 12–15.9)
LDLC SERPL CALC-MCNC: 29 MG/DL (ref 0–100)
LDLC/HDLC SERPL: 0.65 {RATIO}
MCH RBC QN AUTO: 22.8 PG (ref 26.6–33)
MCHC RBC AUTO-ENTMCNC: 30.8 G/DL (ref 31.5–35.7)
MCV RBC AUTO: 73.9 FL (ref 79–97)
PLATELET # BLD AUTO: 253 10*3/MM3 (ref 140–450)
PMV BLD AUTO: 10.1 FL (ref 6–12)
POTASSIUM SERPL-SCNC: 3.7 MMOL/L (ref 3.5–5.2)
RBC # BLD AUTO: 5.05 10*6/MM3 (ref 3.77–5.28)
SODIUM SERPL-SCNC: 143 MMOL/L (ref 136–145)
TRIGL SERPL-MCNC: 142 MG/DL (ref 0–150)
VLDLC SERPL-MCNC: 24 MG/DL (ref 5–40)
WBC NRBC COR # BLD AUTO: 4.74 10*3/MM3 (ref 3.4–10.8)

## 2024-10-11 PROCEDURE — 83036 HEMOGLOBIN GLYCOSYLATED A1C: CPT | Performed by: INTERNAL MEDICINE

## 2024-10-11 PROCEDURE — 80048 BASIC METABOLIC PNL TOTAL CA: CPT | Performed by: INTERNAL MEDICINE

## 2024-10-11 PROCEDURE — 80061 LIPID PANEL: CPT | Performed by: INTERNAL MEDICINE

## 2024-10-11 PROCEDURE — 82948 REAGENT STRIP/BLOOD GLUCOSE: CPT | Performed by: INTERNAL MEDICINE

## 2024-10-11 PROCEDURE — 85027 COMPLETE CBC AUTOMATED: CPT | Performed by: INTERNAL MEDICINE

## 2024-10-11 PROCEDURE — G0378 HOSPITAL OBSERVATION PER HR: HCPCS

## 2024-10-11 RX ORDER — PANTOPRAZOLE SODIUM 40 MG/1
40 TABLET, DELAYED RELEASE ORAL DAILY
Qty: 30 TABLET | Refills: 0 | Status: SHIPPED | OUTPATIENT
Start: 2024-10-11 | End: 2024-11-10

## 2024-10-11 RX ADMIN — FUROSEMIDE 20 MG: 20 TABLET ORAL at 09:08

## 2024-10-11 RX ADMIN — FAMOTIDINE 20 MG: 20 TABLET ORAL at 09:08

## 2024-10-11 RX ADMIN — Medication 10 ML: at 09:08

## 2024-10-11 RX ADMIN — PANTOPRAZOLE SODIUM 40 MG: 40 TABLET, DELAYED RELEASE ORAL at 09:08

## 2024-10-11 RX ADMIN — ASPIRIN 81 MG: 81 TABLET, COATED ORAL at 09:08

## 2024-10-11 RX ADMIN — DILTIAZEM HYDROCHLORIDE 180 MG: 180 CAPSULE, COATED, EXTENDED RELEASE ORAL at 09:08

## 2024-10-11 RX ADMIN — EMPAGLIFLOZIN 10 MG: 10 TABLET, FILM COATED ORAL at 09:08

## 2024-10-11 RX ADMIN — APIXABAN 5 MG: 5 TABLET, FILM COATED ORAL at 09:08

## 2024-10-11 NOTE — OUTREACH NOTE
Prep Survey      Flowsheet Row Responses   [facility] facility patient discharged from? [facility]   Is LACE score < 7 ? Yes   Eligibility Not Eligible   What are the reasons patient is not eligible? Other  [Readmission risk score low]   Does the patient have one of the following disease processes/diagnoses(primary or secondary)? Other   Prep survey completed? Yes            Bre BOWEN - Registered Nurse

## 2024-10-11 NOTE — NURSING NOTE
Went over discharge instructions with patient, verbalized understanding, verbalized understanding to  medication from pharmacy, removed IV, site free from redness or bleeding, went over radial site care and dressing changes with  and patient, verbalized understanding, gave patient hand out on radial site care, removed tele and sent back to monitor room, all personal belongings returned, instructed that I would get wheelchair for patient once she is dressed, offered to assist with helping get dressed, both pt and  denied

## 2024-10-11 NOTE — DISCHARGE SUMMARY
[facility]         DISCHARGE SUMMARY    Patient Name: Bisi Gutierrez  : 1970  MRN: [id number]    Date of Admission: 10/10/2024  Date of Discharge:   Primary Care Physician: Ricardo Guaman MD    Consults       Date and Time Order Name Status Description    10/10/2024  1:23 PM Cardiology (on-call MD unless specified) Completed             Presenting Problem:   Unstable angina [I20.0]  Chest pain [R07.9]  Chest pain, exertional [R07.9]    Active and Resolved Hospital Problems:  Active Hospital Problems    Diagnosis POA    Nephrolithiasis [N20.0] Yes    DM (diabetes mellitus) [E11.9] Yes    Paroxysmal atrial fibrillation [I48.0] Yes      Resolved Hospital Problems    Diagnosis POA    **Chest pain [R07.9] Yes         Hospital Course     Hospital Course:  Bisi Gutierrez is a 54 y.o. female admitted to hospital for chest pain patient was told that she had abnormal stress test done by cardiologist and she reported to ER for chest pain.  Patient admitted to hospital for recommendations from ER and cardiologist for further workup.    See H&P for details..    Patient was seen and examined by me.  Patient is very nonspecific and has been not giving consistent history, patient she says she moved from a different town.  She does not know any doctors here.  She does not take much medications.  Her home meds include Farxiga and diabetic meds.    Patient was admitted, cardiac cath was performed by cardiologist which was negative for acute intervention requirement.  Medical management was recommended.  No significant lesions were noted.    Patient complains of pain sometimes in the chest sometimes in the back.  Complains of sometimes reflux symptoms.  Patient also gives history of fatty liver.  According to the old records patient also has history of pancreatitis her lipase was normal.    A CT of the chest and abdomen was performed because of her persistent pain.  CT abdomen showed  nephrolithiasis.    No ureteric stone was identified.    CT chest was negative for any acute infiltrate.    Patient's pain was most likely musculoskeletal, could be GI in origin.  Also her back was hurting most likely related to degenerative issues of the spine.    Patient was doing fairly well this morning and requested discharge.  She complains of aches and pain very nonspecific.  Recommended pain medications but patient declined.  Patient wanted to go home.  I discussed with her about her kidney stones, should not cause pain as they are located in renal pelvises.  Can cause pain if they descend down into ureter.  Advised to use see urologist    Patient was discharged home with outpatient follow      DISCHARGE Follow Up Recommendations for labs and diagnostics:   Discharged home with outpatient follow-up  Recommend follow-up with urologist for nephrolithiasis.        Day of Discharge     Vital Signs:  Temp:  [97.5 °F (36.4 °C)-98.6 °F (37 °C)] 98.6 °F (37 °C)  Heart Rate:  [69-85] 76  Resp:  [16-18] 16  BP: (112-140)/(67-97) 122/84    Physical Exam:    Middle-aged female not in acute distress.  Awake alert and oriented.      Pertinent  and/or Most Recent Results     LAB RESULTS:      Lab 10/11/24  0448 10/10/24  1950 10/10/24  1250   WBC 4.74  --  3.84   HEMOGLOBIN 11.5*  --  12.7   HEMATOCRIT 37.3  --  41.1   PLATELETS 253  --  302   NEUTROS ABS  --   --  1.65*   IMMATURE GRANS (ABS)  --   --  0.01   LYMPHS ABS  --   --  1.88   MONOS ABS  --   --  0.23   EOS ABS  --   --  0.03   MCV 73.9*  --  73.5*   PROTIME  --  14.1 13.6   APTT  --  44.3* 38.0*         Lab 10/11/24  0448 10/10/24  1250   SODIUM 143 141   POTASSIUM 3.7 3.9   CHLORIDE 107 104   CO2 27.0 27.1   ANION GAP 9.0 9.9   BUN 11 10   CREATININE 0.74 0.86   EGFR 97.5 81.4   GLUCOSE 107* 110*   CALCIUM 9.0 9.6   MAGNESIUM  --  1.8   HEMOGLOBIN A1C 5.60  --          Lab 10/10/24  1250   TOTAL PROTEIN 7.9   ALBUMIN 4.5   GLOBULIN 3.4   ALT (SGPT) 17   AST  (SGOT) 21   BILIRUBIN 1.4*   ALK PHOS 107   LIPASE 29         Lab 10/10/24  1950 10/10/24  1250   PROBNP  --  174.9   HSTROP T  --  <6   PROTIME 14.1 13.6   INR 1.07 1.02         Lab 10/11/24  0448   CHOLESTEROL 91   LDL CHOL 29   HDL CHOL 38*   TRIGLYCERIDES 142             Brief Urine Lab Results       None          Microbiology Results (last 10 days)       ** No results found for the last 240 hours. **            PROCEDURES:    CT Abdomen Pelvis Without Contrast    Result Date: 10/10/2024  Impression: No acute findings are appreciated within the abdomen or pelvis by nonenhanced CT examination. Please see above comments for further detail.    Portions of this note were completed with a voice recognition program.  Electronically Signed By-Rajat Rose MD On:10/10/2024 11:56 PM      CT Chest Without Contrast Diagnostic    Result Date: 10/10/2024  Impression: Impression: CT scan of the chest without IV contrast demonstrating subsegmental atelectasis at the lung bases. Small bilateral pleural effusions, not seen on 7/22/2024. Extensive urolithiasis. Electronically Signed: Ming Jackman MD  10/10/2024 10:09 PM EDT  Workstation ID: [id number]    XR Chest 1 View    Result Date: 10/10/2024  Impression: Impression: 1. No acute cardiopulmonary abnormality. Electronically Signed: Alli Jerome  10/10/2024 1:51 PM EDT  Workstation ID: [id number]    XR Chest 1 View    Result Date: 10/2/2024  Impression: No acute cardiopulmonary findings. Electronically Signed: Jude Feliz MD  10/2/2024 1:01 AM EDT  Workstation ID: [id number]     Results for orders placed during the hospital encounter of 07/22/24    Duplex Venous Lower Extremity - Left CAR    Interpretation Summary    Normal left lower extremity venous duplex scan.      Results for orders placed during the hospital encounter of 07/22/24    Duplex Venous Lower Extremity - Left CAR    Interpretation Summary    Normal left lower extremity venous duplex scan.          Labs  Pending at Discharge:        Discharge Details        Discharge Medications        Continue These Medications        Instructions Start Date   atorvastatin 10 MG tablet  Commonly known as: LIPITOR   10 mg, Oral, Daily      Creon 83895-02562 units capsule delayed-release particles capsule  Generic drug: pancrelipase (Lip-Prot-Amyl)   2 capsules, Oral, 3 Times Daily With Meals      pancrelipase (Lip-Prot-Amyl) [id number] units capsule delayed-release particles capsule  Commonly known as: CREON   1 capsule, Oral, With Snacks      dilTIAZem  MG 24 hr capsule  Commonly known as: DILACOR XR   180 mg, Oral, Daily      Eliquis 5 MG tablet tablet  Generic drug: apixaban   5 mg, Oral, 2 Times Daily      Farxiga 10 MG tablet  Generic drug: dapagliflozin Propanediol   1 tablet, Oral, Daily      furosemide 20 MG tablet  Commonly known as: LASIX   20 mg, Oral, Daily      pantoprazole 40 MG EC tablet  Commonly known as: PROTONIX   40 mg, Oral, Daily      Tirzepatide 5 MG/0.5ML solution pen-injector pen  Commonly known as: MOUNJARO   5 mg, Subcutaneous, Every 7 Days, THURSDAY               Allergies   Allergen Reactions    Fentanyl Hives    Promethazine Itching and Rash         Discharge Disposition:    Home or Self Care    Diet:  Heart healthy 1800 ADA diet      Discharge Activity:     Activity Instructions       Activity as Tolerated              Future Appointments   Date Time Provider Department Center   10/23/2024  7:00 PM [address]   12/6/2024 11:15 AM Harriet Perez MD [facility] [place] [facility]       Additional Instructions for the Follow-ups that You Need to Schedule       Discharge Follow-up with PCP   As directed       Currently Documented PCP:    Ricardo Guaman MD    PCP Phone Number:    [phone]     Follow Up Details: Next week        Discharge Follow-up with Specified Provider: Urologist as an outpatient for nephrolithiasis   As directed      To: Urologist as an outpatient for  nephrolithiasis                Time spent on Discharge including face to face service: 31 minutes.            I have dictated this note utilizing Dragon Dictation.             Please note that portions of this note were completed with a voice recognition program.             Part of this note may be an electronic transcription/translation of spoken language to printed text         using the Dragon Dictation System.       Electronically signed by Clark Middleton MD, 10/11/24, 6:16 PM EDT.

## 2024-10-11 NOTE — NURSING NOTE
Did patients assessment, pt grimacing, voices she is having back pain and a headache, offered medication, ice pack, or position changes, patient denied any help, all she wanted was to be discharged,

## 2024-10-11 NOTE — NURSING NOTE
Upon entering room asked patient how her night was, said she is still  in pain in her back, K pad in use, denies to take any pain medication or ice packs, all patient wants is to be discharged, will monitor

## 2024-10-11 NOTE — NURSING NOTE
Went over scheduled medication with patient, pt still states she does not take jardiance but will take if it will keep blood sugar down, blood sugar within normal limits, will give medication per MAR

## 2024-10-11 NOTE — NURSING NOTE
Patient A&O x4. VSS. Patient refused all scheduled medications on shift. Patient complained of pain on shift but refused any type of pain medication offer to reach out to the MD for different medication. Heating pad in use for back pain. Patient reports that she is ready to be discharged in the AM.

## 2024-10-12 LAB
QT INTERVAL: 392 MS
QTC INTERVAL: 453 MS

## 2024-10-12 NOTE — NURSING NOTE
Did vitals and checked on patient every 15 min from 1510 til 1745 to be able to remove air from TR band, Site clear, applied pressure dressing at 1815, pt still has complaints about back pain, K pad has been in use since 1600 and prn pain meds given for back pain, no other needs voiced,

## 2024-10-12 NOTE — NURSING NOTE
Patient and  walking in donis, offered to take patient down in wheelchair that was sitting right  bedside her, patient and  both denied any help,

## 2024-10-23 ENCOUNTER — HOSPITAL ENCOUNTER (OUTPATIENT)
Dept: MAMMOGRAPHY | Facility: HOSPITAL | Age: 54
Discharge: HOME | End: 2024-10-23
Admitting: FAMILY MEDICINE
Payer: COMMERCIAL

## 2024-10-23 DIAGNOSIS — Z12.31 ENCOUNTER FOR SCREENING MAMMOGRAM FOR HIGH-RISK PATIENT: ICD-10-CM

## 2024-10-23 PROCEDURE — 77067 SCR MAMMO BI INCL CAD: CPT

## 2024-10-23 PROCEDURE — 77063 BREAST TOMOSYNTHESIS BI: CPT

## 2024-12-06 ENCOUNTER — OFFICE VISIT (OUTPATIENT)
Dept: CARDIOLOGY | Facility: CLINIC | Age: 54
End: 2024-12-06
Payer: COMMERCIAL

## 2024-12-06 VITALS
HEART RATE: 81 BPM | BODY MASS INDEX: 34.65 KG/M2 | SYSTOLIC BLOOD PRESSURE: 132 MMHG | HEIGHT: 70 IN | DIASTOLIC BLOOD PRESSURE: 80 MMHG | WEIGHT: 242 LBS

## 2024-12-06 DIAGNOSIS — R42 DIZZINESS: ICD-10-CM

## 2024-12-06 DIAGNOSIS — Z95.0 PRESENCE OF CARDIAC PACEMAKER: ICD-10-CM

## 2024-12-06 DIAGNOSIS — I47.10 PAROXYSMAL SVT (SUPRAVENTRICULAR TACHYCARDIA): Primary | ICD-10-CM

## 2024-12-06 DIAGNOSIS — R53.83 FATIGUE, UNSPECIFIED TYPE: ICD-10-CM

## 2024-12-06 DIAGNOSIS — E87.6 HYPOKALEMIA: ICD-10-CM

## 2024-12-06 RX ORDER — HYDROCHLOROTHIAZIDE 12.5 MG/1
CAPSULE ORAL
COMMUNITY
Start: 2024-11-17

## 2024-12-06 RX ORDER — DILTIAZEM HYDROCHLORIDE 300 MG/1
300 CAPSULE, COATED, EXTENDED RELEASE ORAL DAILY
COMMUNITY
Start: 2024-11-15

## 2024-12-06 RX ORDER — POTASSIUM CHLORIDE 750 MG/1
10 TABLET, EXTENDED RELEASE ORAL DAILY
Qty: 90 TABLET | Refills: 3 | Status: SHIPPED | OUTPATIENT
Start: 2024-12-06

## 2024-12-06 RX ORDER — SPIRONOLACTONE 25 MG/1
25 TABLET ORAL DAILY
COMMUNITY

## 2024-12-06 RX ORDER — OMEPRAZOLE 40 MG/1
40 CAPSULE, DELAYED RELEASE ORAL DAILY
COMMUNITY

## 2024-12-06 NOTE — PROGRESS NOTES
[facility]  INTERVENTIONAL CARDIOLOGY FOLLOW-UP PROGRESS NOTE        Chief Complaint  Follow-up, Dizziness, Palpitations, and sleep apnea     Subjective            History of Present Illness  Bisi Gutierrez is a 54 y.o. female who presents to [facility]    History of Present Illness  The patient presents for evaluation of sleep apnea and hypokalemia.    She reports experiencing fatigue, which she attributes to her sleep apnea. She has been using a CPAP machine since 2016 and believes it may require an update. She does not have a scheduled appointment for this issue. Her cardiologist in [place] was previously managing her sleep apnea, but she is now seeking management locally. She expresses a desire to avoid another sleep study.    She is currently on Lasix and is aware that her potassium levels are low. She has been experiencing muscle spasms, particularly at night, and has not been taking her potassium supplement due to running out of it. She is uncertain about the dosage of her potassium supplement but recalls that she was advised to take it concurrently with Lasix. She recently had her lab work done and reports that all results were within normal limits.    Patient has a past medical history of tachybradycardia syndrome status post dual-chamber pacemaker in 2014, paroxysmal A-fib status post 2 ablation on Eliquis 5 mg twice daily and Cardizem which was recently increased to 300 mg long-acting once daily from 180 mg once daily when she was in [place].     She was recently seen on 8/14/2020 for at cardiology clinic when she was seen to establish care.  Device interrogation done at that time showed episodes of SVT, 13% atrial paced and 0% ventricular paced.  1.5 years for end of life battery change.     She had abnormal stress test and underwent LHC that showed normal coronary arteries during recent hospital visit for scheduled LHC.     Patient chest pain or shortness of  breath.  She has sleep apnea and feels dizzy all day.  Patient plans to address her CPAP by herself.    Supplemental Information  She was hospitalized at [facility] for a week due to episodes of SVT, and her Cardizem was increased to 300 mg.    MEDICATIONS  Current: Lasix, spironolactone         Past History:  Past Medical History:   Diagnosis Date    A-fib     Atrial fibrillation with RVR     CHF (congestive heart failure)     DM (diabetes mellitus)     HTN (hypertension)     SVT (supraventricular tachycardia)        Medical History:  Past Medical History:   Diagnosis Date    A-fib     Atrial fibrillation with RVR     CHF (congestive heart failure)     DM (diabetes mellitus)     HTN (hypertension)     SVT (supraventricular tachycardia)        Surgical History:   has a past surgical history that includes Pacemaker Implantation (2014); Gallbladder surgery; Hysterectomy; Hernia repair; and Cardiac catheterization (N/A, 10/10/2024).     Family History:   family history is not on file.     Social History:   reports that she has never smoked. She has never used smokeless tobacco. She reports that she does not drink alcohol and does not use drugs.    Allergies:   Fentanyl and Promethazine    Current Outpatient Medications on File Prior to Visit   Medication Sig    atorvastatin (LIPITOR) 10 MG tablet Take 1 tablet by mouth Daily.    Continuous Glucose Sensor (FreeStyle Anupam 3 Plus Sensor) USE 1 SENSOR EVERY 15 DAYS AS DIRECTED    Creon 35348-42710 units capsule delayed-release particles capsule Take 2 capsules by mouth 3 (Three) Times a Day With Meals.    dapagliflozin Propanediol (Farxiga) 10 MG tablet Take 10 mg by mouth Daily.    dilTIAZem CD (CARDIZEM CD) 300 MG 24 hr capsule Take 1 capsule by mouth Daily.    Eliquis 5 MG tablet tablet Take 1 tablet by mouth 2 (Two) Times a Day.    furosemide (LASIX) 20 MG tablet Take 1 tablet by mouth Daily.    omeprazole (priLOSEC) 40 MG capsule Take 1 capsule by mouth Daily.     "pancrelipase, Lip-Prot-Amyl, (CREON) 14587-91067 units capsule delayed-release particles capsule Take 1 capsule by mouth With Snacks for Snacks.    spironolactone (ALDACTONE) 25 MG tablet Take 1 tablet by mouth Daily.    Tirzepatide 7.5 MG/0.5ML solution auto-injector Inject 5 mg under the skin into the appropriate area as directed Every 7 (Seven) Days. THURSDAY    [DISCONTINUED] Potassium (POTASSIMIN PO) Take  by mouth.    [DISCONTINUED] dilTIAZem XR (DILACOR XR) 180 MG 24 hr capsule Take 1 capsule by mouth Daily. (Patient not taking: Reported on 12/6/2024)     No current facility-administered medications on file prior to visit.          Review of Systems   Negative except as mentioned in HPI above.    Objective     /80   Pulse 81   Ht 177.8 cm (70\")   Wt 110 kg (242 lb)   BMI 34.72 kg/m²       Physical Exam    General : Alert, awake, no acute distress  Neck : Supple, no carotid bruit, no jugular venous distention  CVS : Regular rate and rhythm, no murmur, rubs or gallops  Lungs: Clear to auscultation bilaterally, no crackles or rhonchi  Abdomen: Soft, nontender, bowel sounds heard in all 4 quadrants  Extremities: Warm, well-perfused, no pedal edema    Result Review :     The following data was reviewed by: Harriet Perez MD on 12/06/2024:    CMP          10/2/2024    00:47 10/10/2024    12:50 10/11/2024    04:48   CMP   Glucose 119  110  107    BUN 13  10  11    Creatinine 0.88  0.86  0.74    EGFR 79.2  81.4  97.5    Sodium 141  141  143    Potassium 3.5  3.9  3.7    Chloride 105  104  107    Calcium 9.7  9.6  9.0    Total Protein 7.4  7.9     Albumin 4.2  4.5     Globulin 3.2  3.4     Total Bilirubin 1.2  1.4     Alkaline Phosphatase 104  107     AST (SGOT) 20  21     ALT (SGPT) 17  17     Albumin/Globulin Ratio 1.3  1.3     BUN/Creatinine Ratio 14.8  11.6  14.9    Anion Gap 13.6  9.9  9.0      CBC          10/2/2024    00:47 10/10/2024    12:50 10/11/2024    04:48   CBC   WBC 5.25  3.84  4.74    RBC " 5.29  5.59  5.05    Hemoglobin 12.1  12.7  11.5    Hematocrit 38.6  41.1  37.3    MCV 73.0  73.5  73.9    MCH 22.9  22.7  22.8    MCHC 31.3  30.9  30.8    RDW 15.0  15.0  15.0    Platelets 282  302  253      TSH          10/2/2024    00:47   TSH   TSH 3.060      Lipid Panel          10/11/2024    04:48   Lipid Panel   Total Cholesterol 91    Triglycerides 142    HDL Cholesterol 38    VLDL Cholesterol 24    LDL Cholesterol  29    LDL/HDL Ratio 0.65       A1C Last 3 Results          10/11/2024    04:48   HGBA1C Last 3 Results   Hemoglobin A1C 5.60          Data reviewed: Cardiology studies      Results for orders placed during the hospital encounter of 10/08/24    Stress Test With Myocardial Perfusion One Day    Interpretation Summary    Left ventricular ejection fraction is borderline normal (Calculated EF = 49%).    Stress MPI shows apical and apical anterior small area moderate intensity reversible defect consistent with ischemia. TID is 0.85 (normal)    Procedures  Results for orders placed during the hospital encounter of 10/10/24    Cardiac Catheterization/Vascular Study    Conclusion  [facility]  CARDIAC CATHETERIZATION PROCEDURE REPORT    Patient: Bisi Gutierrez  : 1970  MRN: [id number]    Procedure Date:  10/10/24    Referring Physician:  Self    Interventional Cardiologist:  Harriet Perez MD, [facility]    Indication:  Abnormal stress test showing apical and apical anterior reversible defect of mild intensity small size  Worsening chest pain on exertion, associated with back pain    Clinical Presentation:  54-year-old lady with history of paroxysmal atrial fibrillation, tachybradycardia syndrome status post pacemaker, hypertension, hyperlipidemia, type 2 diabetes who presented with worsening chest pain on exertion and had recent stress test that was abnormal showing apical and apical anterior small area of mild intensity reversible defect concerning for ischemia.    Procedure performed:  Left  heart catheterization  Selective left and right coronary angiography  Left ventriculography  Ultrasound-guided access  Moderate sedation  Access site closure with TR band    Access Sites:  Right radial artery    Findings:  1. Coronary Artery Anatomy:  Dominance: Right coronary artery  Left Main: No significant stenosis  Left Anterior Descending: Mild luminal irregularities no significant stenosis  Circumflex Artery: Mild luminal irregularities no significant stenosis  Right Coronary Artery: Mild luminal irregularities, no significant stenosis    2. Hemodynamics:  LVEDP 16 mmHg  No significant LV/AO gradient on pullback.    3. Left Ventriculogram:  Ejection Fraction: 55%  Wall Motion: No significant wall motion normalities  Mitral Regurgitation: No significant mitral regurgitation      Procedure Details:  Informed consent was obtained with an explanation of the risks, benefits and alternatives of the procedure. The patient was brought to the Cardiac Catheterization Laboratory and was prepped and draped in a standard sterile fashion. Moderate sedation with Fentanyl and Versed was administered by the circulating nurse. Lidocaine 2% was used to anesthetize the right radial artery and a 6 Slender sheath was placed.  A 5 F TGR catheter was then advanced over a 0.035 guidewire into the ascending aorta.  This catheter was used to engage the right and left coronary arteries with diagnostic angiography obtained in all appropriate projections by injection of non-ionic contrast. We then exchanged for an angled pigtail catheter and enter the left ventricle to measure hemodynamics and perform a left ventriculogram.  The catheter was then removed over a guidewire. The radial artery sheath was removed without difficulty and TR Band was placed over the access site with excellent hemostasis.    Patient tolerated the procedure well without any complications, and transferred to the post procedure area for recovery in a stable  condition.      Complications:  None.    Estimated Blood Loss:  Minimal.    Conclusions:  No significant coronary disease with mild nonobstructive luminal irregularities  No significant aortic stenosis on pullback gradient  Normal LVEF  Noncardiac chest pain    Recommendations:  Likely noncardiac chest pain in nature, evaluate for other causes of chest pain/back pain.  Continue guideline data medical therapy for prevention of CAD.  Patient on anticoagulation for A-fib.  Continue moderate intensity statins.  As needed nitrates  Will uptitrate on calcium channel blocker.  Follow-up with cardiology in 4 to 6 weeks postdischarge.    Status  Urgent    Electronically signed by Harriet ePrez MD, 10/10/24, 3:49 PM EDT.        ASCVD Score  The ASCVD Risk score (Nick DK, et al., 2019) failed to calculate for the following reasons:    The valid total cholesterol range is 130 to 320 mg/dL         Assessment and Plan          Diagnoses and all orders for this visit:    1. Paroxysmal SVT (supraventricular tachycardia) (Primary)    2. Hypokalemia  -     Basic Metabolic Panel; Future    3. Fatigue, unspecified type    4. Presence of cardiac pacemaker    5. Dizziness    Other orders  -     potassium chloride 10 MEQ CR tablet; Take 1 tablet by mouth Daily.  Dispense: 90 tablet; Refill: 3          Assessment & Plan  1. Sleep apnea.  The patient reports feeling exhausted in the morning and acknowledges the need for updated CPAP equipment. A referral to a sleep medicine specialist will be made for further management of her sleep apnea.    2. Hypokalemia.  Her potassium level was previously recorded at 3.7. She is currently on spironolactone, which can potentially elevate potassium levels. A prescription for potassium 10 mg daily has been provided. BMP will be obtained in 4 weeks to monitor her potassium levels.    Follow-up  The patient will follow up in 1 year.        Patient was educated on heart healthy diet, daily exercise and  achieving optimal weight.     Follow Up     Return in about 1 year (around 12/6/2025) for With Bre Steen, Next scheduled follow up.      Bisi Gutierrez  reports that she has never smoked. She has never used smokeless tobacco.        I spent 30 minutes caring for this patient on this date of service. This time includes time spent by me in the following activities:preparing for the visit, reviewing tests, obtaining and/or reviewing a separately obtained history, performing a medically appropriate examination and/or evaluation , counseling and educating the patient/family/caregiver, ordering medications, tests, or procedures, referring and communicating with other health care professionals , documenting information in the medical record, independently interpreting results and communicating that information with the patient/family/caregiver, and care coordination.    I have reviewed the family history, social history, and past medical history for this patient. Previous information and data has been reviewed and updated as needed. I have reviewed and verified the chief complaint, history, and other documentation. The patient was interviewed and examined in the clinic and the chart reviewed. The previous observations, recommendations, and conclusions were reviewed including those of other providers.     The plan was discussed with the patient and/or family. The patient was given time to ask questions and these questions were answered. At the conclusion of their visit they had no additional questions or concerns and all questions were answered to their satisfaction.     Patient was given instructions and counseling regarding her condition or for health maintenance advice. Please see specific information pulled into the AVS if appropriate.      Patient or patient representative verbalized consent for the use of Ambient Listening during the visit with  Harriet Perez MD for chart documentation. 12/6/2024   12:05 EST      Harriet Perez MD, FACC  12/06/24  11:54 EST    Dictated Utilizing Dragon Dictation

## 2024-12-06 NOTE — LETTER
December 6, 2024     Ricardo Guaman MD  [address]  [address]  [place] [address]    Patient: Bisi Gutierrez   YOB: 1970   Date of Visit: 12/6/2024       Dear Ricardo Guaman MD,    Bisi Gutierrez was in my office today. Below are the relevant portions of my assessment and plan of care.           If you have questions, please do not hesitate to call me. I look forward to following Bisi along with you.         Sincerely,        Harriet Perez MD        CC: No Recipients

## 2024-12-16 ENCOUNTER — APPOINTMENT (OUTPATIENT)
Dept: CT IMAGING | Facility: HOSPITAL | Age: 54
End: 2024-12-16
Payer: COMMERCIAL

## 2024-12-16 ENCOUNTER — HOSPITAL ENCOUNTER (EMERGENCY)
Facility: HOSPITAL | Age: 54
Discharge: HOME | End: 2024-12-16
Attending: EMERGENCY MEDICINE | Admitting: EMERGENCY MEDICINE
Payer: COMMERCIAL

## 2024-12-16 VITALS
RESPIRATION RATE: 12 BRPM | BODY MASS INDEX: 35.13 KG/M2 | WEIGHT: 245.37 LBS | TEMPERATURE: 97.8 F | DIASTOLIC BLOOD PRESSURE: 87 MMHG | HEIGHT: 70 IN | OXYGEN SATURATION: 98 % | HEART RATE: 74 BPM | SYSTOLIC BLOOD PRESSURE: 136 MMHG

## 2024-12-16 DIAGNOSIS — N20.0 KIDNEY STONES: Primary | ICD-10-CM

## 2024-12-16 LAB
ALBUMIN SERPL-MCNC: 4.4 G/DL (ref 3.5–5.2)
ALBUMIN/GLOB SERPL: 1.3 G/DL
ALP SERPL-CCNC: 122 U/L (ref 39–117)
ALT SERPL W P-5'-P-CCNC: 17 U/L (ref 1–33)
ANION GAP SERPL CALCULATED.3IONS-SCNC: 11.4 MMOL/L (ref 5–15)
AST SERPL-CCNC: 20 U/L (ref 1–32)
BASOPHILS # BLD AUTO: 0.04 10*3/MM3 (ref 0–0.2)
BASOPHILS NFR BLD AUTO: 0.9 % (ref 0–1.5)
BILIRUB SERPL-MCNC: 1.4 MG/DL (ref 0–1.2)
BILIRUB UR QL STRIP: NEGATIVE
BUN SERPL-MCNC: 13 MG/DL (ref 6–20)
BUN/CREAT SERPL: 13 (ref 7–25)
CALCIUM SPEC-SCNC: 9.7 MG/DL (ref 8.6–10.5)
CHLORIDE SERPL-SCNC: 100 MMOL/L (ref 98–107)
CLARITY UR: CLEAR
CO2 SERPL-SCNC: 26.6 MMOL/L (ref 22–29)
COLOR UR: YELLOW
CREAT SERPL-MCNC: 1 MG/DL (ref 0.57–1)
D-LACTATE SERPL-SCNC: 0.9 MMOL/L (ref 0.5–2)
DEPRECATED RDW RBC AUTO: 38.5 FL (ref 37–54)
EGFRCR SERPLBLD CKD-EPI 2021: 67.9 ML/MIN/1.73
EOSINOPHIL # BLD AUTO: 0.04 10*3/MM3 (ref 0–0.4)
EOSINOPHIL NFR BLD AUTO: 0.9 % (ref 0.3–6.2)
ERYTHROCYTE [DISTWIDTH] IN BLOOD BY AUTOMATED COUNT: 15 % (ref 12.3–15.4)
GLOBULIN UR ELPH-MCNC: 3.5 GM/DL
GLUCOSE SERPL-MCNC: 118 MG/DL (ref 65–99)
GLUCOSE UR STRIP-MCNC: ABNORMAL MG/DL
HCT VFR BLD AUTO: 40.8 % (ref 34–46.6)
HGB BLD-MCNC: 12.5 G/DL (ref 12–15.9)
HGB UR QL STRIP.AUTO: NEGATIVE
HOLD SPECIMEN: NORMAL
HOLD SPECIMEN: NORMAL
IMM GRANULOCYTES # BLD AUTO: 0.01 10*3/MM3 (ref 0–0.05)
IMM GRANULOCYTES NFR BLD AUTO: 0.2 % (ref 0–0.5)
KETONES UR QL STRIP: NEGATIVE
LEUKOCYTE ESTERASE UR QL STRIP.AUTO: NEGATIVE
LIPASE SERPL-CCNC: 25 U/L (ref 13–60)
LYMPHOCYTES # BLD AUTO: 1.8 10*3/MM3 (ref 0.7–3.1)
LYMPHOCYTES NFR BLD AUTO: 39.6 % (ref 19.6–45.3)
MCH RBC QN AUTO: 22.2 PG (ref 26.6–33)
MCHC RBC AUTO-ENTMCNC: 30.6 G/DL (ref 31.5–35.7)
MCV RBC AUTO: 72.6 FL (ref 79–97)
MONOCYTES # BLD AUTO: 0.31 10*3/MM3 (ref 0.1–0.9)
MONOCYTES NFR BLD AUTO: 6.8 % (ref 5–12)
NEUTROPHILS NFR BLD AUTO: 2.34 10*3/MM3 (ref 1.7–7)
NEUTROPHILS NFR BLD AUTO: 51.6 % (ref 42.7–76)
NITRITE UR QL STRIP: NEGATIVE
NRBC BLD AUTO-RTO: 0 /100 WBC (ref 0–0.2)
PH UR STRIP.AUTO: 5.5 [PH] (ref 5–8)
PLATELET # BLD AUTO: 300 10*3/MM3 (ref 140–450)
PMV BLD AUTO: 10.1 FL (ref 6–12)
POTASSIUM SERPL-SCNC: 4 MMOL/L (ref 3.5–5.2)
PROT SERPL-MCNC: 7.9 G/DL (ref 6–8.5)
PROT UR QL STRIP: NEGATIVE
RBC # BLD AUTO: 5.62 10*6/MM3 (ref 3.77–5.28)
SODIUM SERPL-SCNC: 138 MMOL/L (ref 136–145)
SP GR UR STRIP: >1.03 (ref 1–1.03)
UROBILINOGEN UR QL STRIP: ABNORMAL
WBC NRBC COR # BLD AUTO: 4.54 10*3/MM3 (ref 3.4–10.8)
WHOLE BLOOD HOLD COAG: NORMAL
WHOLE BLOOD HOLD SPECIMEN: NORMAL

## 2024-12-16 PROCEDURE — 25010000002 HYDROMORPHONE PER 4 MG: Performed by: EMERGENCY MEDICINE

## 2024-12-16 PROCEDURE — 83605 ASSAY OF LACTIC ACID: CPT

## 2024-12-16 PROCEDURE — 74176 CT ABD & PELVIS W/O CONTRAST: CPT

## 2024-12-16 PROCEDURE — 81003 URINALYSIS AUTO W/O SCOPE: CPT

## 2024-12-16 PROCEDURE — 80053 COMPREHEN METABOLIC PANEL: CPT

## 2024-12-16 PROCEDURE — 99284 EMERGENCY DEPT VISIT MOD MDM: CPT

## 2024-12-16 PROCEDURE — 36415 COLL VENOUS BLD VENIPUNCTURE: CPT

## 2024-12-16 PROCEDURE — 96375 TX/PRO/DX INJ NEW DRUG ADDON: CPT

## 2024-12-16 PROCEDURE — 85025 COMPLETE CBC W/AUTO DIFF WBC: CPT

## 2024-12-16 PROCEDURE — 83690 ASSAY OF LIPASE: CPT

## 2024-12-16 PROCEDURE — 25010000002 ONDANSETRON PER 1 MG: Performed by: EMERGENCY MEDICINE

## 2024-12-16 PROCEDURE — 96374 THER/PROPH/DIAG INJ IV PUSH: CPT

## 2024-12-16 RX ORDER — ONDANSETRON 2 MG/ML
4 INJECTION INTRAMUSCULAR; INTRAVENOUS ONCE
Status: COMPLETED | OUTPATIENT
Start: 2024-12-16 | End: 2024-12-16

## 2024-12-16 RX ORDER — HYDROMORPHONE HYDROCHLORIDE 1 MG/ML
1 INJECTION, SOLUTION INTRAMUSCULAR; INTRAVENOUS; SUBCUTANEOUS ONCE
Status: COMPLETED | OUTPATIENT
Start: 2024-12-16 | End: 2024-12-16

## 2024-12-16 RX ORDER — ONDANSETRON 4 MG/1
4 TABLET, ORALLY DISINTEGRATING ORAL EVERY 8 HOURS PRN
Qty: 14 TABLET | Refills: 0 | Status: SHIPPED | OUTPATIENT
Start: 2024-12-16

## 2024-12-16 RX ORDER — SODIUM CHLORIDE 0.9 % (FLUSH) 0.9 %
10 SYRINGE (ML) INJECTION AS NEEDED
Status: DISCONTINUED | OUTPATIENT
Start: 2024-12-16 | End: 2024-12-16 | Stop reason: HOSPADM

## 2024-12-16 RX ORDER — OXYCODONE AND ACETAMINOPHEN 7.5; 325 MG/1; MG/1
1 TABLET ORAL EVERY 6 HOURS PRN
Qty: 12 TABLET | Refills: 0 | Status: SHIPPED | OUTPATIENT
Start: 2024-12-16

## 2024-12-16 RX ADMIN — ONDANSETRON 4 MG: 2 INJECTION INTRAMUSCULAR; INTRAVENOUS at 12:47

## 2024-12-16 RX ADMIN — HYDROMORPHONE HYDROCHLORIDE 1 MG: 1 INJECTION, SOLUTION INTRAMUSCULAR; INTRAVENOUS; SUBCUTANEOUS at 12:48

## 2024-12-16 NOTE — Clinical Note
[facility] EMERGENCY ROOM  [address] [place] [address]  Phone: [phone]  Fax: [phone]    Bisi Gutierrez was seen and treated in our emergency department on 12/16/2024.  She may return to work on 12/18/2024.         Thank you for choosing [facility].    Jenn Valladares RN

## 2024-12-16 NOTE — ED PROVIDER NOTES
Time: 3:09 PM EST  Date of encounter:  12/16/2024  Independent Historian/Clinical History and Information was obtained by:   Patient    History is limited by: N/A    Chief Complaint: Flank pain      History of Present Illness:  Patient is a 54 y.o. year old female who presents to the emergency department for evaluation of flank pain.  Patient reports a history of kidney stones and pancreatitis and presents complaining of flank pain.      Patient Care Team  Primary Care Provider: Ricardo Guaman MD    Past Medical History:     Allergies   Allergen Reactions    Fentanyl Hives    Promethazine Itching and Rash     Past Medical History:   Diagnosis Date    A-fib     Atrial fibrillation with RVR     CHF (congestive heart failure)     DM (diabetes mellitus)     HTN (hypertension)     SVT (supraventricular tachycardia)      Past Surgical History:   Procedure Laterality Date    CARDIAC CATHETERIZATION N/A 10/10/2024    Procedure: Left Heart Cath with possible coronary angioplasty;  Surgeon: Harriet Perez MD;  Location: [facility] CATH INVASIVE LOCATION;  Service: Cardiology;  Laterality: N/A;    GALLBLADDER SURGERY      HERNIA REPAIR      HYSTERECTOMY      PACEMAKER IMPLANTATION  2014     History reviewed. No pertinent family history.    Home Medications:  Prior to Admission medications    Medication Sig Start Date End Date Taking? Authorizing Provider   atorvastatin (LIPITOR) 10 MG tablet Take 1 tablet by mouth Daily.    Capri Dewey MD   Continuous Glucose Sensor (FreeStyle Anupam 3 Plus Sensor) USE 1 SENSOR EVERY 15 DAYS AS DIRECTED 11/17/24   Capri Dewey MD Creon 06956-06159 units capsule delayed-release particles capsule Take 2 capsules by mouth 3 (Three) Times a Day With Meals. 9/16/24   Capri Dewey MD   dapagliflozin Propanediol (Farxiga) 10 MG tablet Take 10 mg by mouth Daily.    Capri Dewey MD   dilTIAZem CD (CARDIZEM CD) 300 MG 24 hr capsule Take 1 capsule by mouth Daily.  11/15/24   Capri Dewey MD   Eliquis 5 MG tablet tablet Take 1 tablet by mouth 2 (Two) Times a Day.    Capri Dewey MD   furosemide (LASIX) 20 MG tablet Take 1 tablet by mouth Daily. 9/17/24   Harriet Perez MD   omeprazole (priLOSEC) 40 MG capsule Take 1 capsule by mouth Daily.    Capri Dewey MD   ondansetron ODT (ZOFRAN-ODT) 4 MG disintegrating tablet Place 1 tablet on the tongue Every 8 (Eight) Hours As Needed for Nausea or Vomiting. 12/16/24   Jemal Matthews MD   oxyCODONE-acetaminophen (PERCOCET) 7.5-325 MG per tablet Take 1 tablet by mouth Every 6 (Six) Hours As Needed for Severe Pain. 12/16/24   Jemal Matthews MD   pancrelipase, Lip-Prot-Amyl, (CREON) 45189-98535 units capsule delayed-release particles capsule Take 1 capsule by mouth With Snacks for Snacks.    Capri Dewey MD   potassium chloride 10 MEQ CR tablet Take 1 tablet by mouth Daily. 12/6/24   Harriet Perez MD   spironolactone (ALDACTONE) 25 MG tablet Take 1 tablet by mouth Daily.    Capri Dewey MD   Tirzepatide 7.5 MG/0.5ML solution auto-injector Inject 5 mg under the skin into the appropriate area as directed Every 7 (Seven) Days. THURSDAY 8/29/24   Capri Dewey MD        Social History:   Social History     Tobacco Use    Smoking status: Never    Smokeless tobacco: Never   Vaping Use    Vaping status: Never Used   Substance Use Topics    Alcohol use: Never    Drug use: Never         Review of Systems:  Review of Systems   Constitutional:  Negative for chills and fever.   HENT:  Negative for congestion, rhinorrhea and sore throat.    Eyes:  Negative for pain and visual disturbance.   Respiratory:  Negative for apnea, cough, chest tightness and shortness of breath.    Cardiovascular:  Negative for chest pain and palpitations.   Gastrointestinal:  Positive for abdominal pain. Negative for diarrhea, nausea and vomiting.   Genitourinary:  Positive for flank pain. Negative for difficulty  "urinating and dysuria.   Musculoskeletal:  Negative for joint swelling and myalgias.   Skin:  Negative for color change.   Neurological:  Negative for seizures and headaches.   Psychiatric/Behavioral: Negative.     All other systems reviewed and are negative.       Physical Exam:  /87 (BP Location: Right arm, Patient Position: Sitting)   Pulse 74   Temp 97.8 °F (36.6 °C) (Oral)   Resp 12   Ht 177.8 cm (70\")   Wt 111 kg (245 lb 6 oz)   SpO2 98%   BMI 35.21 kg/m²     Physical Exam  Vitals and nursing note reviewed.   Constitutional:       General: She is not in acute distress.     Appearance: Normal appearance. She is not toxic-appearing.   HENT:      Head: Normocephalic and atraumatic.      Jaw: There is normal jaw occlusion.   Eyes:      General: Lids are normal.      Extraocular Movements: Extraocular movements intact.      Conjunctiva/sclera: Conjunctivae normal.      Pupils: Pupils are equal, round, and reactive to light.   Cardiovascular:      Rate and Rhythm: Normal rate and regular rhythm.      Pulses: Normal pulses.      Heart sounds: Normal heart sounds.   Pulmonary:      Effort: Pulmonary effort is normal. No respiratory distress.      Breath sounds: Normal breath sounds. No wheezing or rhonchi.   Abdominal:      General: Abdomen is flat.      Palpations: Abdomen is soft.      Tenderness: There is no abdominal tenderness. There is no guarding or rebound.   Musculoskeletal:         General: Normal range of motion.      Cervical back: Normal range of motion and neck supple.      Right lower leg: No edema.      Left lower leg: No edema.   Skin:     General: Skin is warm and dry.   Neurological:      Mental Status: She is alert and oriented to person, place, and time. Mental status is at baseline.   Psychiatric:         Mood and Affect: Mood normal.                    Medical Decision Making:      Comorbidities that affect care:    Pancreatitis, kidney stones    External Notes reviewed:    Previous " Clinic Note: Endocrinology office visit for diabetes management      The following orders were placed and all results were independently analyzed by me:  Orders Placed This Encounter   Procedures    CT Abdomen Pelvis Without Contrast    [place] Draw    Comprehensive Metabolic Panel    Lipase    Urinalysis With Microscopic If Indicated (No Culture) - Urine, Clean Catch    Lactic Acid, Plasma    CBC Auto Differential    NPO Diet NPO Type: Strict NPO    Undress & Gown    Insert Peripheral IV    CBC & Differential    Green Top (Gel)    Lavender Top    Gold Top - SST    Light Blue Top       Medications Given in the Emergency Department:  Medications   sodium chloride 0.9 % flush 10 mL (has no administration in time range)   HYDROmorphone PF (DILAUDID) injection 1 mg (1 mg Intravenous Given 12/16/24 1248)   ondansetron (ZOFRAN) injection 4 mg (4 mg Intravenous Given 12/16/24 1247)        ED Course:         Labs:    Lab Results (last 24 hours)       Procedure Component Value Units Date/Time    Urinalysis With Microscopic If Indicated (No Culture) - Urine, Clean Catch [[id number]]  (Abnormal) Collected: 12/16/24 1018    Specimen: Urine, Clean Catch Updated: 12/16/24 1033     Color, UA Yellow     Appearance, UA Clear     pH, UA 5.5     Specific Gravity, UA >1.030     Glucose, UA >=1000 mg/dL (3+)     Ketones, UA Negative     Bilirubin, UA Negative     Blood, UA Negative     Protein, UA Negative     Leuk Esterase, UA Negative     Nitrite, UA Negative     Urobilinogen, UA 0.2 E.U./dL    Narrative:      Urine microscopic not indicated.    CBC & Differential [[id number]]  (Abnormal) Collected: 12/16/24 1055    Specimen: Blood from Arm, Left Updated: 12/16/24 1112    Narrative:      The following orders were created for panel order CBC & Differential.  Procedure                               Abnormality         Status                     ---------                               -----------         ------                     CBC Auto  Differential[[id number]]        Abnormal            Final result               Scan Slide[[id number]]                                                                    Please view results for these tests on the individual orders.    Comprehensive Metabolic Panel [[id number]]  (Abnormal) Collected: 12/16/24 1055    Specimen: Blood from Arm, Left Updated: 12/16/24 1124     Glucose 118 mg/dL      BUN 13 mg/dL      Creatinine 1.00 mg/dL      Sodium 138 mmol/L      Potassium 4.0 mmol/L      Chloride 100 mmol/L      CO2 26.6 mmol/L      Calcium 9.7 mg/dL      Total Protein 7.9 g/dL      Albumin 4.4 g/dL      ALT (SGPT) 17 U/L      AST (SGOT) 20 U/L      Alkaline Phosphatase 122 U/L      Total Bilirubin 1.4 mg/dL      Globulin 3.5 gm/dL      A/G Ratio 1.3 g/dL      BUN/Creatinine Ratio 13.0     Anion Gap 11.4 mmol/L      eGFR 67.9 mL/min/1.73     Narrative:      GFR Categories in Chronic Kidney Disease (CKD)      GFR Category          GFR (mL/min/1.73)    Interpretation  G1                     90 or greater         Normal or high (1)  G2                      60-89                Mild decrease (1)  G3a                   45-59                Mild to moderate decrease  G3b                   30-44                Moderate to severe decrease  G4                    15-29                Severe decrease  G5                    14 or less           Kidney failure          (1)In the absence of evidence of kidney disease, neither GFR category G1 or G2 fulfill the criteria for CKD.    eGFR calculation 2021 CKD-EPI creatinine equation, which does not include race as a factor    Lipase [[id number]]  (Normal) Collected: 12/16/24 1055    Specimen: Blood from Arm, Left Updated: 12/16/24 1124     Lipase 25 U/L     Lactic Acid, Plasma [[id number]]  (Normal) Collected: 12/16/24 1055    Specimen: Blood from Arm, Left Updated: 12/16/24 1122     Lactate 0.9 mmol/L     CBC Auto Differential [[id number]]  (Abnormal) Collected: 12/16/24 1055    Specimen:  Blood from Arm, Left Updated: 12/16/24 1112     WBC 4.54 10*3/mm3      RBC 5.62 10*6/mm3      Hemoglobin 12.5 g/dL      Hematocrit 40.8 %      MCV 72.6 fL      MCH 22.2 pg      MCHC 30.6 g/dL      RDW 15.0 %      RDW-SD 38.5 fl      MPV 10.1 fL      Platelets 300 10*3/mm3      Neutrophil % 51.6 %      Lymphocyte % 39.6 %      Monocyte % 6.8 %      Eosinophil % 0.9 %      Basophil % 0.9 %      Immature Grans % 0.2 %      Neutrophils, Absolute 2.34 10*3/mm3      Lymphocytes, Absolute 1.80 10*3/mm3      Monocytes, Absolute 0.31 10*3/mm3      Eosinophils, Absolute 0.04 10*3/mm3      Basophils, Absolute 0.04 10*3/mm3      Immature Grans, Absolute 0.01 10*3/mm3      nRBC 0.0 /100 WBC              Imaging:    CT Abdomen Pelvis Without Contrast    Result Date: 12/16/2024  CT ABDOMEN PELVIS WO CONTRAST Date of Exam: 12/16/2024 1:07 PM EST Indication: Flank pain, kidney stone suspected flank pain, h/o kidney stone flank pain. Comparison: CT abdomen pelvis without contrast dated 10/10/2024 Technique: Axial CT images were obtained of the abdomen and pelvis without the administration of contrast. Reconstructed coronal and sagittal images were also obtained. Automated exposure control and iterative construction methods were used. Findings: The lung bases are clear bilaterally. A cholecystectomy has been performed. The liver, spleen, pancreas and adrenal glands appear unremarkable. Punctate nonobstructing renal stones are noted bilaterally. The kidneys otherwise appear unremarkable. No hydronephrosis is noted. No ureteral stone  is evident. The urinary bladder appears unremarkable. No adenopathy or free fluid is seen in the abdomen or pelvis. A hysterectomy has been performed. The adnexal regions appear unremarkable. No acute bowel abnormality is seen. The appendix appears normal. The lack of oral contrast limits evaluation of the bowel. No hernia is seen. No focal osseous lesion is evident.     Impression: 1.Previous  cholecystectomy and hysterectomy 2.Punctate nonobstructing renal stones bilaterally. Electronically Signed: Isaias Elkins MD  12/16/2024 2:10 PM EST  Workstation ID: [id number]       Differential Diagnosis and Discussion:    Abdominal Pain: Based on the patient's signs and symptoms, I considered abdominal aortic aneurysm, small bowel obstruction, pancreatitis, acute cholecystitis, acute appendecitis, peptic ulcer disease, gastritis, colitis, endocrine disorders, irritable bowel syndrome and other differential diagnosis an etiology of the patient's abdominal pain.    PROCEDURES:    Labs were drawn in the emergency department and all labs were reviewed and interpreted by me.  CT scan was performed in the emergency department and the CT scan radiology impression was interpreted by me.    No orders to display       Procedures    MDM  Number of Diagnoses or Management Options  Kidney stones  Diagnosis management comments: In summary this is a 54-year-old female who presents to the emergency department for evaluation of flank/abdomen pain.  CBC independently reviewed and interpreted by me and shows no critical abnormalities.  CMP independently reviewed and interpreted by me and shows no critical abnormalities.  Lipase independently viewed interpreted by me is unremarkable for acute pathology.  Urinalysis independently reviewed interpreted by me is unremarkable for acute pathology.  CT scan abdomen pelvis reviewed by me does reveal punctate bilateral nephrolithiasis, no other acute abnormalities identified.  Patient given pain control in the emerged part with improvement of her symptoms.  She will be discharged home with oral pain control and antiemetics.  Very strict return to ER and follow-up instructions have been provided to the patient.                         Patient Care Considerations:    CONSULT: I considered consulting urology, however obstructive uropathy identified      Consultants/Shared Management  Plan:    None    Social Determinants of Health:    Patient is independent, reliable, and has access to care.       Disposition and Care Coordination:    Discharged: I considered escalation of care by admitting this patient to the hospital, however no obstructive uropathy identified    I have explained the patient´s condition, diagnoses and treatment plan based on the information available to me at this time. I have answered questions and addressed any concerns. The patient has a good  understanding of the patient´s diagnosis, condition, and treatment plan as can be expected at this point. The vital signs have been stable. The patient´s condition is stable and appropriate for discharge from the emergency department.      The patient will pursue further outpatient evaluation with the primary care physician or other designated or consulting physician as outlined in the discharge instructions. They are agreeable to this plan of care and follow-up instructions have been explained in detail. The patient has received these instructions in written format and has expressed an understanding of the discharge instructions. The patient is aware that any significant change in condition or worsening of symptoms should prompt an immediate return to this or the closest emergency department or call to 911.  I have explained discharge medications and the need for follow up with the patient/caretakers. This was also printed in the discharge instructions. Patient was discharged with the following medications and follow up:      Medication List        New Prescriptions      ondansetron ODT 4 MG disintegrating tablet  Commonly known as: ZOFRAN-ODT  Place 1 tablet on the tongue Every 8 (Eight) Hours As Needed for Nausea or Vomiting.     oxyCODONE-acetaminophen 7.5-325 MG per tablet  Commonly known as: PERCOCET  Take 1 tablet by mouth Every 6 (Six) Hours As Needed for Severe Pain.               Where to Get Your Medications        These  medications were sent to [facility], [place] - [address] - [phone]  - [phone] FX  [address] [place] [address]      Phone: [phone]   ondansetron ODT 4 MG disintegrating tablet  oxyCODONE-acetaminophen 7.5-325 MG per tablet      Ricardo Guaman MD  [address] [facility] [address]  [facility] [address] [phone]    In 1 week         Final diagnoses:   Kidney stones        ED Disposition       ED Disposition   Discharge    Condition   Stable    Comment   --               This medical record created using voice recognition software.             Jemal Matthews MD  12/16/24 [id number]

## 2025-01-15 RX ORDER — ATORVASTATIN CALCIUM 10 MG/1
10 TABLET, FILM COATED ORAL DAILY
Qty: 90 TABLET | Refills: 3 | Status: SHIPPED | OUTPATIENT
Start: 2025-01-15

## 2025-01-15 RX ORDER — SPIRONOLACTONE 25 MG/1
25 TABLET ORAL DAILY
Qty: 90 TABLET | Refills: 3 | Status: SHIPPED | OUTPATIENT
Start: 2025-01-15

## 2025-01-15 RX ORDER — APIXABAN 5 MG/1
5 TABLET, FILM COATED ORAL 2 TIMES DAILY
Qty: 180 TABLET | Refills: 3 | Status: SHIPPED | OUTPATIENT
Start: 2025-01-15

## 2025-01-15 RX ORDER — FUROSEMIDE 20 MG/1
20 TABLET ORAL DAILY
Qty: 90 TABLET | Refills: 3 | Status: SHIPPED | OUTPATIENT
Start: 2025-01-15

## 2025-01-15 RX ORDER — DILTIAZEM HYDROCHLORIDE 300 MG/1
300 CAPSULE, COATED, EXTENDED RELEASE ORAL DAILY
Qty: 90 CAPSULE | Refills: 3 | Status: SHIPPED | OUTPATIENT
Start: 2025-01-15

## 2025-01-15 RX ORDER — POTASSIUM CHLORIDE 750 MG/1
10 TABLET, EXTENDED RELEASE ORAL DAILY
Qty: 90 TABLET | Refills: 3 | Status: SHIPPED | OUTPATIENT
Start: 2025-01-15

## 2025-01-15 NOTE — TELEPHONE ENCOUNTER
Pt called stating she needed a new script for all her medications that our office prescribes and it needed to be a 3 mo. supply in order for her insurance to cover it.     Rx Refill Note  Requested Prescriptions     Pending Prescriptions Disp Refills    atorvastatin (LIPITOR) 10 MG tablet 90 tablet 3     Sig: Take 1 tablet by mouth Daily.    dilTIAZem CD (CARDIZEM CD) 300 MG 24 hr capsule 90 capsule 3     Sig: Take 1 capsule by mouth Daily.    Eliquis 5 MG tablet tablet 180 tablet 3     Sig: Take 1 tablet by mouth 2 (Two) Times a Day.    furosemide (LASIX) 20 MG tablet 90 tablet 3     Sig: Take 1 tablet by mouth Daily.    potassium chloride 10 MEQ CR tablet 90 tablet 3     Sig: Take 1 tablet by mouth Daily.    spironolactone (ALDACTONE) 25 MG tablet 90 tablet 3     Sig: Take 1 tablet by mouth Daily.        LAST OFFICE VISIT:  12/6/2024     NEXT OFFICE VISIT:  NO F/U SCHEDULED    Does the medication requests match the last office note:    [x] Yes   [] No    Does this refill request meet protocol details for MA to approve:     [x] Yes   [] No

## 2025-02-17 ENCOUNTER — APPOINTMENT (OUTPATIENT)
Dept: GENERAL RADIOLOGY | Facility: HOSPITAL | Age: 55
End: 2025-02-17

## 2025-02-17 ENCOUNTER — HOSPITAL ENCOUNTER (EMERGENCY)
Facility: HOSPITAL | Age: 55
Discharge: HOME | End: 2025-02-17
Attending: EMERGENCY MEDICINE | Admitting: EMERGENCY MEDICINE

## 2025-02-17 VITALS
DIASTOLIC BLOOD PRESSURE: 80 MMHG | HEART RATE: 106 BPM | RESPIRATION RATE: 20 BRPM | WEIGHT: 233.69 LBS | BODY MASS INDEX: 33.46 KG/M2 | HEIGHT: 70 IN | SYSTOLIC BLOOD PRESSURE: 123 MMHG | TEMPERATURE: 99 F | OXYGEN SATURATION: 99 %

## 2025-02-17 DIAGNOSIS — N39.0 URINARY TRACT INFECTION WITHOUT HEMATURIA, SITE UNSPECIFIED: ICD-10-CM

## 2025-02-17 DIAGNOSIS — J10.1 INFLUENZA A: Primary | ICD-10-CM

## 2025-02-17 LAB
ALBUMIN SERPL-MCNC: 4.2 G/DL (ref 3.5–5.2)
ALBUMIN/GLOB SERPL: 1.1 G/DL
ALP SERPL-CCNC: 137 U/L (ref 39–117)
ALT SERPL W P-5'-P-CCNC: 26 U/L (ref 1–33)
ANION GAP SERPL CALCULATED.3IONS-SCNC: 13.5 MMOL/L (ref 5–15)
AST SERPL-CCNC: 33 U/L (ref 1–32)
BACTERIA UR QL AUTO: ABNORMAL /HPF
BASOPHILS # BLD AUTO: 0.03 10*3/MM3 (ref 0–0.2)
BASOPHILS NFR BLD AUTO: 0.8 % (ref 0–1.5)
BILIRUB SERPL-MCNC: 1.7 MG/DL (ref 0–1.2)
BILIRUB UR QL STRIP: ABNORMAL
BUN SERPL-MCNC: 15 MG/DL (ref 6–20)
BUN/CREAT SERPL: 15.2 (ref 7–25)
CALCIUM SPEC-SCNC: 9.5 MG/DL (ref 8.6–10.5)
CHLORIDE SERPL-SCNC: 101 MMOL/L (ref 98–107)
CLARITY UR: ABNORMAL
CO2 SERPL-SCNC: 21.5 MMOL/L (ref 22–29)
COLOR UR: ABNORMAL
CREAT SERPL-MCNC: 0.99 MG/DL (ref 0.57–1)
DEPRECATED RDW RBC AUTO: 38.8 FL (ref 37–54)
EGFRCR SERPLBLD CKD-EPI 2021: 68.7 ML/MIN/1.73
EOSINOPHIL # BLD AUTO: 0 10*3/MM3 (ref 0–0.4)
EOSINOPHIL NFR BLD AUTO: 0 % (ref 0.3–6.2)
ERYTHROCYTE [DISTWIDTH] IN BLOOD BY AUTOMATED COUNT: 15 % (ref 12.3–15.4)
FLUAV SUBTYP SPEC NAA+PROBE: DETECTED
FLUBV RNA ISLT QL NAA+PROBE: NOT DETECTED
GLOBULIN UR ELPH-MCNC: 4 GM/DL
GLUCOSE SERPL-MCNC: 112 MG/DL (ref 65–99)
GLUCOSE UR STRIP-MCNC: ABNORMAL MG/DL
HCT VFR BLD AUTO: 44.1 % (ref 34–46.6)
HGB BLD-MCNC: 13.7 G/DL (ref 12–15.9)
HGB UR QL STRIP.AUTO: NEGATIVE
HOLD SPECIMEN: NORMAL
HOLD SPECIMEN: NORMAL
HYALINE CASTS UR QL AUTO: ABNORMAL /LPF
IMM GRANULOCYTES # BLD AUTO: 0 10*3/MM3 (ref 0–0.05)
IMM GRANULOCYTES NFR BLD AUTO: 0 % (ref 0–0.5)
KETONES UR QL STRIP: ABNORMAL
LEUKOCYTE ESTERASE UR QL STRIP.AUTO: ABNORMAL
LYMPHOCYTES # BLD AUTO: 1.66 10*3/MM3 (ref 0.7–3.1)
LYMPHOCYTES NFR BLD AUTO: 42.5 % (ref 19.6–45.3)
MCH RBC QN AUTO: 22.9 PG (ref 26.6–33)
MCHC RBC AUTO-ENTMCNC: 31.1 G/DL (ref 31.5–35.7)
MCV RBC AUTO: 73.7 FL (ref 79–97)
MICROCYTES BLD QL: NORMAL
MONOCYTES # BLD AUTO: 0.46 10*3/MM3 (ref 0.1–0.9)
MONOCYTES NFR BLD AUTO: 11.8 % (ref 5–12)
MUCOUS THREADS URNS QL MICRO: ABNORMAL /HPF
NEUTROPHILS NFR BLD AUTO: 1.76 10*3/MM3 (ref 1.7–7)
NEUTROPHILS NFR BLD AUTO: 44.9 % (ref 42.7–76)
NITRITE UR QL STRIP: NEGATIVE
NRBC BLD AUTO-RTO: 0 /100 WBC (ref 0–0.2)
NT-PROBNP SERPL-MCNC: 58.9 PG/ML (ref 0–900)
PH UR STRIP.AUTO: 5.5 [PH] (ref 5–8)
PLATELET # BLD AUTO: 245 10*3/MM3 (ref 140–450)
PMV BLD AUTO: 10 FL (ref 6–12)
POTASSIUM SERPL-SCNC: 4 MMOL/L (ref 3.5–5.2)
PROT SERPL-MCNC: 8.2 G/DL (ref 6–8.5)
PROT UR QL STRIP: ABNORMAL
QT INTERVAL: 352 MS
QTC INTERVAL: 446 MS
RBC # BLD AUTO: 5.98 10*6/MM3 (ref 3.77–5.28)
RBC # UR STRIP: ABNORMAL /HPF
REF LAB TEST METHOD: ABNORMAL
RSV RNA NPH QL NAA+NON-PROBE: NOT DETECTED
SARS-COV-2 RNA RESP QL NAA+PROBE: NOT DETECTED
SMALL PLATELETS BLD QL SMEAR: ADEQUATE
SODIUM SERPL-SCNC: 136 MMOL/L (ref 136–145)
SP GR UR STRIP: >=1.03 (ref 1–1.03)
SQUAMOUS #/AREA URNS HPF: ABNORMAL /HPF
TROPONIN T SERPL HS-MCNC: <6 NG/L
UROBILINOGEN UR QL STRIP: ABNORMAL
WBC # UR STRIP: ABNORMAL /HPF
WBC MORPH BLD: NORMAL
WBC NRBC COR # BLD AUTO: 3.91 10*3/MM3 (ref 3.4–10.8)
WHOLE BLOOD HOLD COAG: NORMAL
WHOLE BLOOD HOLD SPECIMEN: NORMAL

## 2025-02-17 PROCEDURE — 94799 UNLISTED PULMONARY SVC/PX: CPT

## 2025-02-17 PROCEDURE — 87637 SARSCOV2&INF A&B&RSV AMP PRB: CPT | Performed by: EMERGENCY MEDICINE

## 2025-02-17 PROCEDURE — 94640 AIRWAY INHALATION TREATMENT: CPT

## 2025-02-17 PROCEDURE — 85007 BL SMEAR W/DIFF WBC COUNT: CPT

## 2025-02-17 PROCEDURE — 84484 ASSAY OF TROPONIN QUANT: CPT

## 2025-02-17 PROCEDURE — 81001 URINALYSIS AUTO W/SCOPE: CPT

## 2025-02-17 PROCEDURE — 99284 EMERGENCY DEPT VISIT MOD MDM: CPT

## 2025-02-17 PROCEDURE — 36415 COLL VENOUS BLD VENIPUNCTURE: CPT

## 2025-02-17 PROCEDURE — 85025 COMPLETE CBC W/AUTO DIFF WBC: CPT

## 2025-02-17 PROCEDURE — 96372 THER/PROPH/DIAG INJ SC/IM: CPT

## 2025-02-17 PROCEDURE — 25010000002 KETOROLAC TROMETHAMINE PER 15 MG

## 2025-02-17 PROCEDURE — 93005 ELECTROCARDIOGRAM TRACING: CPT

## 2025-02-17 PROCEDURE — 80053 COMPREHEN METABOLIC PANEL: CPT

## 2025-02-17 PROCEDURE — 83880 ASSAY OF NATRIURETIC PEPTIDE: CPT

## 2025-02-17 PROCEDURE — 71045 X-RAY EXAM CHEST 1 VIEW: CPT

## 2025-02-17 RX ORDER — METHYLPREDNISOLONE SODIUM SUCCINATE 125 MG/2ML
80 INJECTION, POWDER, LYOPHILIZED, FOR SOLUTION INTRAMUSCULAR; INTRAVENOUS ONCE
Status: DISCONTINUED | OUTPATIENT
Start: 2025-02-17 | End: 2025-02-17

## 2025-02-17 RX ORDER — SODIUM CHLORIDE 0.9 % (FLUSH) 0.9 %
10 SYRINGE (ML) INJECTION AS NEEDED
Status: DISCONTINUED | OUTPATIENT
Start: 2025-02-17 | End: 2025-02-17 | Stop reason: HOSPADM

## 2025-02-17 RX ORDER — GUAIFENESIN 600 MG/1
1200 TABLET, EXTENDED RELEASE ORAL 2 TIMES DAILY
Qty: 20 TABLET | Refills: 0 | Status: SHIPPED | OUTPATIENT
Start: 2025-02-17 | End: 2025-02-22

## 2025-02-17 RX ORDER — BENZONATATE 100 MG/1
100 CAPSULE ORAL 3 TIMES DAILY PRN
Qty: 20 CAPSULE | Refills: 0 | Status: SHIPPED | OUTPATIENT
Start: 2025-02-17

## 2025-02-17 RX ORDER — KETOROLAC TROMETHAMINE 15 MG/ML
15 INJECTION, SOLUTION INTRAMUSCULAR; INTRAVENOUS ONCE
Status: DISCONTINUED | OUTPATIENT
Start: 2025-02-17 | End: 2025-02-17

## 2025-02-17 RX ORDER — IPRATROPIUM BROMIDE AND ALBUTEROL SULFATE 2.5; .5 MG/3ML; MG/3ML
3 SOLUTION RESPIRATORY (INHALATION) ONCE
Status: COMPLETED | OUTPATIENT
Start: 2025-02-17 | End: 2025-02-17

## 2025-02-17 RX ORDER — CEPHALEXIN 500 MG/1
500 CAPSULE ORAL 4 TIMES DAILY
Qty: 28 CAPSULE | Refills: 0 | Status: SHIPPED | OUTPATIENT
Start: 2025-02-17 | End: 2025-02-24

## 2025-02-17 RX ORDER — KETOROLAC TROMETHAMINE 30 MG/ML
30 INJECTION, SOLUTION INTRAMUSCULAR; INTRAVENOUS ONCE
Status: COMPLETED | OUTPATIENT
Start: 2025-02-17 | End: 2025-02-17

## 2025-02-17 RX ADMIN — KETOROLAC TROMETHAMINE 30 MG: 30 INJECTION, SOLUTION INTRAMUSCULAR; INTRAVENOUS at 13:52

## 2025-02-17 RX ADMIN — IPRATROPIUM BROMIDE AND ALBUTEROL SULFATE 3 ML: .5; 3 SOLUTION RESPIRATORY (INHALATION) at 14:23

## 2025-02-17 NOTE — ED PROVIDER NOTES
Time: 1:07 PM EST  Date of encounter:  2/17/2025  Independent Historian/Clinical History and Information was obtained by:   Patient    History is limited by: N/A    Chief Complaint: cough      History of Present Illness:  Patient is a 54 y.o. year old female who presents to the emergency department for evaluation of cough congestion, shortness of breath ongoing for 3 days.  Patient also reports fevers as high as 102 at home.  Reports chest pain with cough and deep breathing.  Reports productive cough with yellow sputum.  Patient reports she works at a  and has been discussed multiple illnesses recently.  Reports loss of taste and smell.      Patient Care Team  Primary Care Provider: Provider, No Known    Past Medical History:     Allergies   Allergen Reactions    Fentanyl Hives    Promethazine Itching and Rash     Past Medical History:   Diagnosis Date    A-fib     Atrial fibrillation with RVR     CHF (congestive heart failure)     DM (diabetes mellitus)     HTN (hypertension)     SVT (supraventricular tachycardia)      Past Surgical History:   Procedure Laterality Date    CARDIAC CATHETERIZATION N/A 10/10/2024    Procedure: Left Heart Cath with possible coronary angioplasty;  Surgeon: Harriet Perez MD;  Location: [facility] CATH INVASIVE LOCATION;  Service: Cardiology;  Laterality: N/A;    GALLBLADDER SURGERY      HERNIA REPAIR      HYSTERECTOMY      PACEMAKER IMPLANTATION  2014     History reviewed. No pertinent family history.    Home Medications:  Prior to Admission medications    Medication Sig Start Date End Date Taking? Authorizing Provider   atorvastatin (LIPITOR) 10 MG tablet Take 1 tablet by mouth Daily. 1/15/25   Harriet Perez MD   Continuous Glucose Sensor (FreeStyle Anupam 3 Plus Sensor) USE 1 SENSOR EVERY 15 DAYS AS DIRECTED 11/17/24   Provider, MD Sourav Farooq [address]997-80371 units capsule delayed-release particles capsule Take 2 capsules by mouth 3 (Three) Times a Day With Meals.  9/16/24   Capri Dewey MD   dapagliflozin Propanediol (Farxiga) 10 MG tablet Take 10 mg by mouth Daily.    Capri Dewey MD   dilTIAZem CD (CARDIZEM CD) 300 MG 24 hr capsule Take 1 capsule by mouth Daily. 1/15/25   Harriet Perez MD   Eliquis 5 MG tablet tablet Take 1 tablet by mouth 2 (Two) Times a Day. 1/15/25   Harriet Perez MD   empagliflozin (JARDIANCE) 25 MG tablet tablet Take 1 tablet by mouth Daily. 11/21/24 5/20/25  Capri Dewey MD   furosemide (LASIX) 20 MG tablet Take 1 tablet by mouth Daily. 1/15/25   Harriet Perez MD   omeprazole (priLOSEC) 40 MG capsule Take 1 capsule by mouth Daily.    Capri Dewey MD   pancrelipase, Lip-Prot-Amyl, (CREON) 78648-60190 units capsule delayed-release particles capsule Take 1 capsule by mouth With Snacks for Snacks.    Capri Dewey MD   potassium chloride 10 MEQ CR tablet Take 1 tablet by mouth Daily. 1/15/25   Harriet Perez MD   spironolactone (ALDACTONE) 25 MG tablet Take 1 tablet by mouth Daily. 1/15/25   Harriet Perez MD   Tirzepatide (Mounjaro) 7.5 MG/0.5ML solution auto-injector Inject 7.5 mg under the skin into the appropriate area as directed Every 7 (Seven) Days. 11/21/24   Capri Dewey MD   ondansetron ODT (ZOFRAN-ODT) 4 MG disintegrating tablet Place 1 tablet on the tongue Every 8 (Eight) Hours As Needed for Nausea or Vomiting. 12/16/24 2/17/25  Jemal Matthews MD   oxyCODONE-acetaminophen (PERCOCET) 7.5-325 MG per tablet Take 1 tablet by mouth Every 6 (Six) Hours As Needed for Severe Pain. 12/16/24 2/17/25  Jemal Matthews MD   Tirzepatide 7.5 MG/0.5ML solution auto-injector Inject 5 mg under the skin into the appropriate area as directed Every 7 (Seven) Days. THURSDAY 8/29/24 2/17/25  Provider, MD Capri        Social History:   Social History     Tobacco Use    Smoking status: Never    Smokeless tobacco: Never   Vaping Use    Vaping status: Never Used   Substance Use Topics     "Alcohol use: Never    Drug use: Never         Review of Systems:  Review of Systems   Constitutional:  Positive for chills, fatigue and fever.   HENT:  Positive for congestion and rhinorrhea. Negative for ear pain and sore throat.    Eyes:  Negative for visual disturbance.   Respiratory:  Positive for cough, shortness of breath and wheezing.    Cardiovascular:  Positive for chest pain.   Gastrointestinal:  Negative for abdominal pain, diarrhea and vomiting.   Genitourinary:  Negative for difficulty urinating.   Musculoskeletal:  Positive for arthralgias and myalgias. Negative for back pain.   Skin:  Negative for rash.   Neurological:  Positive for headaches. Negative for light-headedness.   Hematological:  Negative for adenopathy.   Psychiatric/Behavioral: Negative.          Physical Exam:  /80 (BP Location: Right arm, Patient Position: Sitting)   Pulse 106   Temp 99 °F (37.2 °C) (Oral)   Resp 20   Ht 177.8 cm (70\")   Wt 106 kg (233 lb 11 oz)   SpO2 99%   BMI 33.53 kg/m²     Physical Exam  Vitals and nursing note reviewed.   Constitutional:       General: She is not in acute distress.     Appearance: Normal appearance. She is not toxic-appearing.   HENT:      Head: Normocephalic and atraumatic.      Right Ear: Tympanic membrane normal.      Left Ear: Tympanic membrane normal.      Nose: Nose normal.      Mouth/Throat:      Mouth: Mucous membranes are moist.   Eyes:      Conjunctiva/sclera: Conjunctivae normal.   Cardiovascular:      Rate and Rhythm: Tachycardia present.      Pulses: Normal pulses.      Heart sounds: Normal heart sounds.   Pulmonary:      Effort: Pulmonary effort is normal.      Breath sounds: Wheezing present.   Abdominal:      General: Bowel sounds are normal.      Palpations: Abdomen is soft.      Tenderness: There is no abdominal tenderness.   Musculoskeletal:         General: Normal range of motion.      Cervical back: Normal range of motion.   Skin:     General: Skin is warm and " dry.   Neurological:      General: No focal deficit present.      Mental Status: She is alert and oriented to person, place, and time.   Psychiatric:         Mood and Affect: Mood normal.         Behavior: Behavior normal.         Thought Content: Thought content normal.         Judgment: Judgment normal.                    Medical Decision Making:      Comorbidities that affect care:    Diabetes, Hypertension    External Notes reviewed:    None      The following orders were placed and all results were independently analyzed by me:  Orders Placed This Encounter   Procedures    COVID-19, FLU A/B, RSV PCR 1 HR TAT - Swab, Nasopharynx    XR Chest 1 View    [place] Draw    Comprehensive Metabolic Panel    BNP    High Sensitivity Troponin T    CBC Auto Differential    Urinalysis With Microscopic If Indicated (No Culture) - Urine, Clean Catch    Scan Slide    Urinalysis, Microscopic Only - Urine, Clean Catch    ECG 12 Lead Dyspnea    CBC & Differential    Green Top (Gel)    Lavender Top    Gold Top - SST    Light Blue Top       Medications Given in the Emergency Department:  Medications   sodium chloride 0.9 % flush 10 mL (has no administration in time range)   ketorolac (TORADOL) injection 30 mg (30 mg Intramuscular Given 2/17/25 1352)   ipratropium-albuterol (DUO-NEB) nebulizer solution 3 mL (3 mL Nebulization Given 2/17/25 1423)        ED Course:         Labs:    Lab Results (last 24 hours)       Procedure Component Value Units Date/Time    Covid-19 + Flu A&B AG, Veritor ([id number]) [[id number]] Collected: 02/17/25 0936    Specimen: Swab Updated: 02/17/25 0936     SARS Antigen Not Detected     Influenza A Antigen KENZIE Not Detected     Influenza B Antigen KENZIE Not Detected     Internal Control Passed     Lot Number [id number]     Expiration Date 11/21/2025    COVID-19, FLU A/B, RSV PCR 1 HR TAT - Swab, Nasopharynx [[id number]]  (Abnormal) Collected: 02/17/25 1203    Specimen: Swab from Nasopharynx Updated: 02/17/25 1306      COVID19 Not Detected     Influenza A PCR Detected     Influenza B PCR Not Detected     RSV, PCR Not Detected    Narrative:      Fact sheet for providers: https://www.fda.gov/media/670968/download    Fact sheet for patients: https://www.fda.gov/media/980150/download    Test performed by PCR.    Urinalysis With Microscopic If Indicated (No Culture) - Urine, Clean Catch [[id number]]  (Abnormal) Collected: 02/17/25 1319    Specimen: Urine, Clean Catch Updated: 02/17/25 1349     Color, UA Dark Yellow     Appearance, UA Cloudy     pH, UA 5.5     Specific Gravity, UA >=1.030     Glucose,  mg/dL (2+)     Ketones, UA 15 mg/dL (1+)     Bilirubin, UA Small (1+)     Blood, UA Negative     Protein, UA 30 mg/dL (1+)     Leuk Esterase, UA Small (1+)     Nitrite, UA Negative     Urobilinogen, UA 1.0 E.U./dL    Urinalysis, Microscopic Only - Urine, Clean Catch [[id number]]  (Abnormal) Collected: 02/17/25 1319    Specimen: Urine, Clean Catch Updated: 02/17/25 1407     RBC, UA 0-2 /HPF      WBC, UA 11-20 /HPF      Bacteria, UA 3+ /HPF      Squamous Epithelial Cells, UA 7-12 /HPF      Hyaline Casts, UA 0-2 /LPF      Mucus, UA Trace /HPF      Methodology Manual Light Microscopy    CBC & Differential [[id number]]  (Abnormal) Collected: 02/17/25 1326    Specimen: Blood Updated: 02/17/25 8053    Narrative:      The following orders were created for panel order CBC & Differential.  Procedure                               Abnormality         Status                     ---------                               -----------         ------                     CBC Auto Differential[[id number]]        Abnormal            Final result               Scan Slide[[id number]]                                       Final result                 Please view results for these tests on the individual orders.    Comprehensive Metabolic Panel [[id number]]  (Abnormal) Collected: 02/17/25 1326    Specimen: Blood Updated: 02/17/25 1358     Glucose 112 mg/dL      BUN  15 mg/dL      Creatinine 0.99 mg/dL      Sodium 136 mmol/L      Potassium 4.0 mmol/L      Comment: Slight hemolysis detected by analyzer. Result may be falsely elevated.        Chloride 101 mmol/L      CO2 21.5 mmol/L      Calcium 9.5 mg/dL      Total Protein 8.2 g/dL      Albumin 4.2 g/dL      ALT (SGPT) 26 U/L      AST (SGOT) 33 U/L      Alkaline Phosphatase 137 U/L      Total Bilirubin 1.7 mg/dL      Globulin 4.0 gm/dL      A/G Ratio 1.1 g/dL      BUN/Creatinine Ratio 15.2     Anion Gap 13.5 mmol/L      eGFR 68.7 mL/min/1.73     Narrative:      GFR Categories in Chronic Kidney Disease (CKD)      GFR Category          GFR (mL/min/1.73)    Interpretation  G1                     90 or greater         Normal or high (1)  G2                      60-89                Mild decrease (1)  G3a                   45-59                Mild to moderate decrease  G3b                   30-44                Moderate to severe decrease  G4                    15-29                Severe decrease  G5                    14 or less           Kidney failure          (1)In the absence of evidence of kidney disease, neither GFR category G1 or G2 fulfill the criteria for CKD.    eGFR calculation 2021 CKD-EPI creatinine equation, which does not include race as a factor    BNP [[id number]]  (Normal) Collected: 02/17/25 1326    Specimen: Blood Updated: 02/17/25 1355     proBNP 58.9 pg/mL     Narrative:      This assay is used as an aid in the diagnosis of individuals suspected of having heart failure. It can be used as an aid in the diagnosis of acute decompensated heart failure (ADHF) in patients presenting with signs and symptoms of ADHF to the emergency department (ED). In addition, NT-proBNP of <300 pg/mL indicates ADHF is not likely.    Age Range Result Interpretation  NT-proBNP Concentration (pg/mL:      <50             Positive            >450                   Gray                 300-450                    Negative              <300    50-75           Positive            >900                  Gray                300-900                  Negative            <300      >75             Positive            >1800                  Gray                300-1800                  Negative            <300    High Sensitivity Troponin T [[id number]]  (Normal) Collected: 02/17/25 1326    Specimen: Blood Updated: 02/17/25 1358     HS Troponin T <6 ng/L     Narrative:      High Sensitive Troponin T Reference Range:  <14.0 ng/L- Negative Female for AMI  <22.0 ng/L- Negative Male for AMI  >=14 - Abnormal Female indicating possible myocardial injury.  >=22 - Abnormal Male indicating possible myocardial injury.   Clinicians would have to utilize clinical acumen, EKG, Troponin, and serial changes to determine if it is an Acute Myocardial Infarction or myocardial injury due to an underlying chronic condition.         CBC Auto Differential [[id number]]  (Abnormal) Collected: 02/17/25 1326    Specimen: Blood Updated: 02/17/25 1453     WBC 3.91 10*3/mm3      RBC 5.98 10*6/mm3      Hemoglobin 13.7 g/dL      Hematocrit 44.1 %      MCV 73.7 fL      MCH 22.9 pg      MCHC 31.1 g/dL      RDW 15.0 %      RDW-SD 38.8 fl      MPV 10.0 fL      Platelets 245 10*3/mm3      Neutrophil % 44.9 %      Lymphocyte % 42.5 %      Monocyte % 11.8 %      Eosinophil % 0.0 %      Basophil % 0.8 %      Immature Grans % 0.0 %      Neutrophils, Absolute 1.76 10*3/mm3      Lymphocytes, Absolute 1.66 10*3/mm3      Monocytes, Absolute 0.46 10*3/mm3      Eosinophils, Absolute 0.00 10*3/mm3      Basophils, Absolute 0.03 10*3/mm3      Immature Grans, Absolute 0.00 10*3/mm3      nRBC 0.0 /100 WBC     Scan Slide [[id number]] Collected: 02/17/25 1326    Specimen: Blood Updated: 02/17/25 1453     Microcytes Slight/1+     WBC Morphology Normal     Platelet Estimate Adequate             Imaging:    XR Chest 1 View    Result Date: 2/17/2025  XR CHEST 1 VW Date of Exam: 2/17/2025 12:45 PM EST Indication:  CHF/COPD Protocol Comparison: October 10, 2024 FINDINGS: No definite focal or diffuse pulmonary infiltrate is identified.  No pneumothorax or significant pleural effusion. Heart size appears within normal limits. Pacemaker is present. Mediastinal contour appears grossly normal.     No radiographic findings of acute cardiopulmonary abnormality. Electronically Signed: Lucian Dickson  2/17/2025 12:51 PM EST  Workstation ID: [id number]       Differential Diagnosis and Discussion:    Cough: Differential diagnosis includes but is not limited to pneumonia, acute bronchitis, upper respiratory infection, ACE inhibitor use, allergic reaction, epiglottitis, seasonal allergies, chemical irritants, exercise-induced asthma, viral syndrome.    PROCEDURES:    Labs were collected in the emergency department and all labs were reviewed and interpreted by me.  X-ray were performed in the emergency department and all X-ray impressions were independently interpreted by me.  An EKG was performed and the EKG was interpreted by supervising attending.    ECG 12 Lead Dyspnea   Preliminary Result   HEART RATE=99  bpm   RR Zadtvpgz=505  ms   ND Luzxtmua=817  ms   P Horizontal Axis=33  deg   P Front Axis=49  deg   QRSD Ateowdvb=541  ms   QT Eohygxzq=809  ms   TTaS=666  ms   QRS Axis=-19  deg   T Wave Axis=151  deg   - ABNORMAL ECG -   Sinus rhythm   Atrial premature complex   Borderline left axis deviation   Low voltage, precordial leads   Probable anteroseptal infarct, old   Abnormal T, consider ischemia, lateral leads   Date and Time of Study:2025-02-17 13:03:36          Procedures    MDM  Number of Diagnoses or Management Options  Influenza A  Urinary tract infection without hematuria, site unspecified  Diagnosis management comments: I have explained the patient´s condition, diagnoses and treatment plan based on the information available to me at this time. I have answered questions and addressed any concerns. The patient has a good   understanding of the patient´s diagnosis, condition, and treatment plan as can be expected at this point. The vital signs have been stable. The patient´s condition is stable and appropriate for discharge from the emergency department.      The patient will pursue further outpatient evaluation with the primary care physician or other designated or consulting physician as outlined in the discharge instructions. They are agreeable to this plan of care and follow-up instructions have been explained in detail. The patient has received these instructions in written format and has expressed an understanding of the discharge instructions. The patient is aware that any significant change in condition or worsening of symptoms should prompt an immediate return to this or the closest emergency department or call to 911.         Amount and/or Complexity of Data Reviewed  Decide to obtain previous medical records or to obtain history from someone other than the patient: yes                       Patient Care Considerations:    SEPSIS was considered but is NOT present in the emergency department as SIRS criteria is not present.      Consultants/Shared Management Plan:    None    Social Determinants of Health:    Patient is independent, reliable, and has access to care.       Disposition and Care Coordination:    Discharged: The patient is suitable and stable for discharge with no need for consideration of admission.    I have explained the patient´s condition, diagnoses and treatment plan based on the information available to me at this time. I have answered questions and addressed any concerns. The patient has a good  understanding of the patient´s diagnosis, condition, and treatment plan as can be expected at this point. The vital signs have been stable. The patient´s condition is stable and appropriate for discharge from the emergency department.      The patient will pursue further outpatient evaluation with the primary care  physician or other designated or consulting physician as outlined in the discharge instructions. They are agreeable to this plan of care and follow-up instructions have been explained in detail. The patient has received these instructions in written format and has expressed an understanding of the discharge instructions. The patient is aware that any significant change in condition or worsening of symptoms should prompt an immediate return to this or the closest emergency department or call to 911.  I have explained discharge medications and the need for follow up with the patient/caretakers. This was also printed in the discharge instructions. Patient was discharged with the following medications and follow up:      Medication List        New Prescriptions      benzonatate 100 MG capsule  Commonly known as: TESSALON  Take 1 capsule by mouth 3 (Three) Times a Day As Needed for Cough.     cephalexin 500 MG capsule  Commonly known as: KEFLEX  Take 1 capsule by mouth 4 (Four) Times a Day for 7 days.     guaiFENesin 600 MG 12 hr tablet  Commonly known as: MUCINEX  Take 2 tablets by mouth 2 (Two) Times a Day for 5 days.               Where to Get Your Medications        These medications were sent to [address] [phone] [facility] [phone] [address] [address]      Phone: [phone]   benzonatate 100 MG capsule  cephalexin 500 MG capsule  guaiFENesin 600 MG 12 hr tablet      Provider, No Known  [facility]  [place] [address]    Go to   As needed       Final diagnoses:   Influenza A   Urinary tract infection without hematuria, site unspecified        ED Disposition       ED Disposition   Discharge    Condition   Stable    Comment   --               This medical record created using voice recognition software.             Qi Botello, APRN  02/17/25 [id number]

## 2025-02-17 NOTE — Clinical Note
[facility] EMERGENCY ROOM  [address]  [place] [address]  Phone: [phone]  Fax: [phone]    Bisi Gutierrez was seen and treated in our emergency department on 2/17/2025.  She may return to work on 02/24/2025.         Thank you for choosing [facility].    Qi Botello, APRN

## 2025-02-17 NOTE — DISCHARGE INSTRUCTIONS
Please follow with your primary care provider as needed.  Return to the ED for any new or worsening concerning symptoms.  You have been prescribed antibiotics to treat urinary tract infection as well as cough and mucus reduction medication to  at your pharmacy today and begin taking.

## 2025-03-05 LAB
QT INTERVAL: 352 MS
QTC INTERVAL: 446 MS

## 2025-03-09 ENCOUNTER — HOSPITAL ENCOUNTER (EMERGENCY)
Facility: HOSPITAL | Age: 55
Discharge: HOME | End: 2025-03-09
Attending: EMERGENCY MEDICINE | Admitting: EMERGENCY MEDICINE
Payer: MEDICAID

## 2025-03-09 ENCOUNTER — APPOINTMENT (OUTPATIENT)
Dept: GENERAL RADIOLOGY | Facility: HOSPITAL | Age: 55
End: 2025-03-09
Payer: MEDICAID

## 2025-03-09 VITALS
HEART RATE: 100 BPM | WEIGHT: 243.39 LBS | SYSTOLIC BLOOD PRESSURE: 118 MMHG | TEMPERATURE: 98 F | DIASTOLIC BLOOD PRESSURE: 79 MMHG | HEIGHT: 70 IN | OXYGEN SATURATION: 99 % | BODY MASS INDEX: 34.84 KG/M2 | RESPIRATION RATE: 26 BRPM

## 2025-03-09 DIAGNOSIS — R07.9 NONSPECIFIC CHEST PAIN: Primary | ICD-10-CM

## 2025-03-09 LAB
ALBUMIN SERPL-MCNC: 4.4 G/DL (ref 3.5–5.2)
ALBUMIN/GLOB SERPL: 1.2 G/DL
ALP SERPL-CCNC: 126 U/L (ref 39–117)
ALT SERPL W P-5'-P-CCNC: 18 U/L (ref 1–33)
ANION GAP SERPL CALCULATED.3IONS-SCNC: 13.4 MMOL/L (ref 5–15)
AST SERPL-CCNC: 28 U/L (ref 1–32)
BASOPHILS # BLD AUTO: 0.04 10*3/MM3 (ref 0–0.2)
BASOPHILS NFR BLD AUTO: 0.7 % (ref 0–1.5)
BILIRUB SERPL-MCNC: 0.9 MG/DL (ref 0–1.2)
BUN SERPL-MCNC: 12 MG/DL (ref 6–20)
BUN/CREAT SERPL: 14.5 (ref 7–25)
CALCIUM SPEC-SCNC: 9.7 MG/DL (ref 8.6–10.5)
CHLORIDE SERPL-SCNC: 102 MMOL/L (ref 98–107)
CO2 SERPL-SCNC: 22.6 MMOL/L (ref 22–29)
CREAT SERPL-MCNC: 0.83 MG/DL (ref 0.57–1)
D DIMER PPP FEU-MCNC: 0.45 MCGFEU/ML (ref 0–0.53)
DEPRECATED RDW RBC AUTO: 37.2 FL (ref 37–54)
EGFRCR SERPLBLD CKD-EPI 2021: 84.4 ML/MIN/1.73
EOSINOPHIL # BLD AUTO: 0.01 10*3/MM3 (ref 0–0.4)
EOSINOPHIL NFR BLD AUTO: 0.2 % (ref 0.3–6.2)
ERYTHROCYTE [DISTWIDTH] IN BLOOD BY AUTOMATED COUNT: 14.6 % (ref 12.3–15.4)
FLUAV SUBTYP SPEC NAA+PROBE: NOT DETECTED
FLUBV RNA ISLT QL NAA+PROBE: NOT DETECTED
GEN 5 1HR TROPONIN T REFLEX: 7 NG/L
GLOBULIN UR ELPH-MCNC: 3.8 GM/DL
GLUCOSE SERPL-MCNC: 124 MG/DL (ref 65–99)
HCT VFR BLD AUTO: 38.5 % (ref 34–46.6)
HGB BLD-MCNC: 11.9 G/DL (ref 12–15.9)
HOLD SPECIMEN: NORMAL
HOLD SPECIMEN: NORMAL
IMM GRANULOCYTES # BLD AUTO: 0.03 10*3/MM3 (ref 0–0.05)
IMM GRANULOCYTES NFR BLD AUTO: 0.6 % (ref 0–0.5)
LIPASE SERPL-CCNC: 34 U/L (ref 13–60)
LYMPHOCYTES # BLD AUTO: 1.95 10*3/MM3 (ref 0.7–3.1)
LYMPHOCYTES NFR BLD AUTO: 35.8 % (ref 19.6–45.3)
MAGNESIUM SERPL-MCNC: 1.8 MG/DL (ref 1.6–2.6)
MCH RBC QN AUTO: 22.2 PG (ref 26.6–33)
MCHC RBC AUTO-ENTMCNC: 30.9 G/DL (ref 31.5–35.7)
MCV RBC AUTO: 72 FL (ref 79–97)
MICROCYTES BLD QL: NORMAL
MONOCYTES # BLD AUTO: 0.38 10*3/MM3 (ref 0.1–0.9)
MONOCYTES NFR BLD AUTO: 7 % (ref 5–12)
NEUTROPHILS NFR BLD AUTO: 3.03 10*3/MM3 (ref 1.7–7)
NEUTROPHILS NFR BLD AUTO: 55.7 % (ref 42.7–76)
NRBC BLD AUTO-RTO: 0 /100 WBC (ref 0–0.2)
NT-PROBNP SERPL-MCNC: 224.6 PG/ML (ref 0–900)
OVALOCYTES BLD QL SMEAR: NORMAL
PLATELET # BLD AUTO: 346 10*3/MM3 (ref 140–450)
PMV BLD AUTO: 9.5 FL (ref 6–12)
POTASSIUM SERPL-SCNC: 3.6 MMOL/L (ref 3.5–5.2)
PROT SERPL-MCNC: 8.2 G/DL (ref 6–8.5)
QT INTERVAL: 373 MS
QTC INTERVAL: 504 MS
RBC # BLD AUTO: 5.35 10*6/MM3 (ref 3.77–5.28)
RSV RNA NPH QL NAA+NON-PROBE: NOT DETECTED
SARS-COV-2 RNA RESP QL NAA+PROBE: NOT DETECTED
SMALL PLATELETS BLD QL SMEAR: ADEQUATE
SODIUM SERPL-SCNC: 138 MMOL/L (ref 136–145)
T4 FREE SERPL-MCNC: 2.28 NG/DL (ref 0.92–1.68)
TROPONIN T NUMERIC DELTA: -1 NG/L
TROPONIN T SERPL HS-MCNC: 8 NG/L
TSH SERPL DL<=0.05 MIU/L-ACNC: 0.18 UIU/ML (ref 0.27–4.2)
WBC MORPH BLD: NORMAL
WBC NRBC COR # BLD AUTO: 5.44 10*3/MM3 (ref 3.4–10.8)
WHOLE BLOOD HOLD COAG: NORMAL
WHOLE BLOOD HOLD SPECIMEN: NORMAL

## 2025-03-09 PROCEDURE — 71045 X-RAY EXAM CHEST 1 VIEW: CPT

## 2025-03-09 PROCEDURE — 84484 ASSAY OF TROPONIN QUANT: CPT | Performed by: EMERGENCY MEDICINE

## 2025-03-09 PROCEDURE — 87637 SARSCOV2&INF A&B&RSV AMP PRB: CPT | Performed by: EMERGENCY MEDICINE

## 2025-03-09 PROCEDURE — 83880 ASSAY OF NATRIURETIC PEPTIDE: CPT | Performed by: EMERGENCY MEDICINE

## 2025-03-09 PROCEDURE — 84443 ASSAY THYROID STIM HORMONE: CPT | Performed by: EMERGENCY MEDICINE

## 2025-03-09 PROCEDURE — 96374 THER/PROPH/DIAG INJ IV PUSH: CPT

## 2025-03-09 PROCEDURE — 93005 ELECTROCARDIOGRAM TRACING: CPT | Performed by: EMERGENCY MEDICINE

## 2025-03-09 PROCEDURE — 85007 BL SMEAR W/DIFF WBC COUNT: CPT | Performed by: EMERGENCY MEDICINE

## 2025-03-09 PROCEDURE — 83690 ASSAY OF LIPASE: CPT | Performed by: EMERGENCY MEDICINE

## 2025-03-09 PROCEDURE — 84439 ASSAY OF FREE THYROXINE: CPT | Performed by: EMERGENCY MEDICINE

## 2025-03-09 PROCEDURE — 85025 COMPLETE CBC W/AUTO DIFF WBC: CPT | Performed by: EMERGENCY MEDICINE

## 2025-03-09 PROCEDURE — 80053 COMPREHEN METABOLIC PANEL: CPT | Performed by: EMERGENCY MEDICINE

## 2025-03-09 PROCEDURE — 83735 ASSAY OF MAGNESIUM: CPT | Performed by: EMERGENCY MEDICINE

## 2025-03-09 PROCEDURE — 36415 COLL VENOUS BLD VENIPUNCTURE: CPT

## 2025-03-09 PROCEDURE — 93005 ELECTROCARDIOGRAM TRACING: CPT

## 2025-03-09 PROCEDURE — 99284 EMERGENCY DEPT VISIT MOD MDM: CPT

## 2025-03-09 PROCEDURE — 85379 FIBRIN DEGRADATION QUANT: CPT | Performed by: EMERGENCY MEDICINE

## 2025-03-09 RX ORDER — SODIUM CHLORIDE 0.9 % (FLUSH) 0.9 %
10 SYRINGE (ML) INJECTION AS NEEDED
Status: DISCONTINUED | OUTPATIENT
Start: 2025-03-09 | End: 2025-03-09 | Stop reason: HOSPADM

## 2025-03-09 RX ORDER — ASPIRIN 81 MG/1
324 TABLET, CHEWABLE ORAL ONCE
Status: DISCONTINUED | OUTPATIENT
Start: 2025-03-09 | End: 2025-03-09

## 2025-03-09 RX ORDER — SUCRALFATE 1 G/1
1 TABLET ORAL
Qty: 28 TABLET | Refills: 0 | Status: SHIPPED | OUTPATIENT
Start: 2025-03-09

## 2025-03-09 RX ORDER — FAMOTIDINE 10 MG/ML
20 INJECTION, SOLUTION INTRAVENOUS ONCE
Status: COMPLETED | OUTPATIENT
Start: 2025-03-09 | End: 2025-03-09

## 2025-03-09 RX ADMIN — FAMOTIDINE 20 MG: 10 INJECTION INTRAVENOUS at 18:49

## 2025-03-09 NOTE — DISCHARGE INSTRUCTIONS
Follow-up with your primary care provider and/or cardiologist first thing tomorrow.  Return to the emergency department immediately for worsening of symptoms.

## 2025-03-09 NOTE — ED PROVIDER NOTES
Time: 5:33 PM EDT  Date of encounter:  3/9/2025  Independent Historian/Clinical History and Information was obtained by:   Patient    History is limited by: N/A    Chief Complaint: Chest pain, difficulty breathing      History of Present Illness:  Patient is a 55 y.o. year old female who presents to the emergency department for evaluation of chest pain and difficulty breathing for the past 2 days.  Patient states she has palpitations and rapid heart rate with standing, however feels confident this is not her atrial fibrillation as episodes seem to last seconds and not minutes to hours like her A-fib.  Afebrile.      Patient Care Team  Primary Care Provider: Provider, No Known    Past Medical History:     Allergies   Allergen Reactions    Fentanyl Hives    Promethazine Itching and Rash     Past Medical History:   Diagnosis Date    A-fib     Atrial fibrillation with RVR     CHF (congestive heart failure)     DM (diabetes mellitus)     HTN (hypertension)     SVT (supraventricular tachycardia)      Past Surgical History:   Procedure Laterality Date    CARDIAC CATHETERIZATION N/A 10/10/2024    Procedure: Left Heart Cath with possible coronary angioplasty;  Surgeon: Harriet Perez MD;  Location: [facility] CATH INVASIVE LOCATION;  Service: Cardiology;  Laterality: N/A;    GALLBLADDER SURGERY      HERNIA REPAIR      HYSTERECTOMY      PACEMAKER IMPLANTATION  2014     History reviewed. No pertinent family history.    Home Medications:  Prior to Admission medications    Medication Sig Start Date End Date Taking? Authorizing Provider   atorvastatin (LIPITOR) 10 MG tablet Take 1 tablet by mouth Daily. 1/15/25   Harriet Perez MD   benzonatate (TESSALON) 100 MG capsule Take 1 capsule by mouth 3 (Three) Times a Day As Needed for Cough. 2/17/25   Qi Botello P, APRN   Continuous Glucose Sensor (FreeStyle Anupam 3 Plus Sensor) USE 1 SENSOR EVERY 15 DAYS AS DIRECTED 11/17/24   Provider, MD Capri   Creon 17738-63726 units  capsule delayed-release particles capsule Take 2 capsules by mouth 3 (Three) Times a Day With Meals. 9/16/24   Capri Dewey MD   dapagliflozin Propanediol (Farxiga) 10 MG tablet Take 10 mg by mouth Daily.    Capri Dewey MD   dilTIAZem CD (CARDIZEM CD) 300 MG 24 hr capsule Take 1 capsule by mouth Daily. 1/15/25   Harriet Perez MD   Eliquis 5 MG tablet tablet Take 1 tablet by mouth 2 (Two) Times a Day. 1/15/25   Harriet Perez MD   empagliflozin (JARDIANCE) 25 MG tablet tablet Take 1 tablet by mouth Daily. 11/21/24 5/20/25  Capri Dewey MD   furosemide (LASIX) 20 MG tablet Take 1 tablet by mouth Daily. 1/15/25   Harriet Perez MD   omeprazole (priLOSEC) 40 MG capsule Take 1 capsule by mouth Daily.    Capri Dewey MD   pancrelipase, Lip-Prot-Amyl, (CREON) 09401-66082 units capsule delayed-release particles capsule Take 1 capsule by mouth With Snacks for Snacks.    Capri Dewey MD   potassium chloride 10 MEQ CR tablet Take 1 tablet by mouth Daily. 1/15/25   Harriet Perez MD   spironolactone (ALDACTONE) 25 MG tablet Take 1 tablet by mouth Daily. 1/15/25   Harriet Perez MD   Tirzepatide (Mounjaro) 7.5 MG/0.5ML solution auto-injector Inject 7.5 mg under the skin into the appropriate area as directed Every 7 (Seven) Days. 11/21/24   Capri Dewey MD        Social History:   Social History     Tobacco Use    Smoking status: Never    Smokeless tobacco: Never   Vaping Use    Vaping status: Never Used   Substance Use Topics    Alcohol use: Never    Drug use: Never         Review of Systems:  Review of Systems   Constitutional:  Negative for chills and fever.   HENT:  Negative for congestion, rhinorrhea and sore throat.    Eyes:  Negative for photophobia.   Respiratory:  Positive for shortness of breath. Negative for apnea, cough and chest tightness.    Cardiovascular:  Positive for chest pain. Negative for palpitations.   Gastrointestinal:  Positive for  "abdominal pain and nausea. Negative for diarrhea and vomiting.   Endocrine: Negative.    Genitourinary:  Negative for difficulty urinating and dysuria.   Musculoskeletal:  Negative for back pain, joint swelling and myalgias.   Skin:  Negative for color change and wound.   Allergic/Immunologic: Negative.    Neurological:  Negative for seizures and headaches.   Psychiatric/Behavioral: Negative.     All other systems reviewed and are negative.       Physical Exam:  /79   Pulse 100   Temp 98 °F (36.7 °C) (Oral)   Resp 26   Ht 177.8 cm (70\")   Wt 110 kg (243 lb 6.2 oz)   SpO2 99%   BMI 34.92 kg/m²     Physical Exam  Vitals and nursing note reviewed.   Constitutional:       General: She is awake.      Appearance: Normal appearance.   HENT:      Head: Normocephalic and atraumatic.      Nose: Nose normal.      Mouth/Throat:      Mouth: Mucous membranes are moist.   Eyes:      Extraocular Movements: Extraocular movements intact.      Pupils: Pupils are equal, round, and reactive to light.   Cardiovascular:      Rate and Rhythm: Regular rhythm. Tachycardia present.      Heart sounds: Normal heart sounds.   Pulmonary:      Effort: Pulmonary effort is normal. No respiratory distress.      Breath sounds: Normal breath sounds. No wheezing, rhonchi or rales.   Abdominal:      General: Bowel sounds are normal.      Palpations: Abdomen is soft.      Tenderness: There is no abdominal tenderness. There is no guarding or rebound.      Comments: No rigidity   Musculoskeletal:         General: No tenderness. Normal range of motion.      Cervical back: Normal range of motion and neck supple.   Skin:     General: Skin is warm and dry.      Coloration: Skin is not jaundiced.   Neurological:      General: No focal deficit present.      Mental Status: She is alert. Mental status is at baseline.   Psychiatric:         Mood and Affect: Mood is anxious.                    Medical Decision Making:      Comorbidities that affect " care:    CHF, diabetes, hypertension, SVT, atrial fibrillation    External Notes reviewed:    Previous Clinic Note: Cardiology office visit 12/6/2024.  Description: Paroxysmal SVT      The following orders were placed and all results were independently analyzed by me:  Orders Placed This Encounter   Procedures    COVID-19, FLU A/B, RSV PCR 1 HR TAT - Swab, Nasopharynx    XR Chest 1 View    [place] Draw    High Sensitivity Troponin T    Comprehensive Metabolic Panel    Lipase    BNP    Magnesium    CBC Auto Differential    Scan Slide    High Sensitivity Troponin T 1Hr    D-dimer, Quantitative    TSH Rfx On Abnormal To Free T4    T4, Free    NPO Diet NPO Type: Strict NPO    Undress & Gown    Continuous Pulse Oximetry    Oxygen Therapy- Nasal Cannula; Titrate 1-6 LPM Per SpO2; 90 - 95%    ECG 12 Lead ED Triage Standing Order; Chest Pain    ECG 12 Lead ED Triage Standing Order; Chest Pain    Insert Peripheral IV    CBC & Differential    Green Top (Gel)    Lavender Top    Gold Top - SST    Light Blue Top       Medications Given in the Emergency Department:  Medications   sodium chloride 0.9 % flush 10 mL (has no administration in time range)   famotidine (PEPCID) injection 20 mg (20 mg Intravenous Given 3/9/25 1849)        ED Course:    ED Course as of 03/09/25 2000   Sun Mar 09, 2025   1648 I have personally interpreted the EKG today and it shows no evidence of any acute ischemia or heart arrhythmia.  Sinus tachycardia, heart rate 109 [RP]      ED Course User Index  [RP] Dewayne Freitas MD       Labs:    Lab Results (last 24 hours)       Procedure Component Value Units Date/Time    High Sensitivity Troponin T [[id number]]  (Normal) Collected: 03/09/25 1707    Specimen: Blood Updated: 03/09/25 1737     HS Troponin T 8 ng/L     Narrative:      High Sensitive Troponin T Reference Range:  <14.0 ng/L- Negative Female for AMI  <22.0 ng/L- Negative Male for AMI  >=14 - Abnormal Female indicating possible myocardial  injury.  >=22 - Abnormal Male indicating possible myocardial injury.   Clinicians would have to utilize clinical acumen, EKG, Troponin, and serial changes to determine if it is an Acute Myocardial Infarction or myocardial injury due to an underlying chronic condition.         CBC & Differential [[id number]]  (Abnormal) Collected: 03/09/25 1707    Specimen: Blood Updated: 03/09/25 1735    Narrative:      The following orders were created for panel order CBC & Differential.  Procedure                               Abnormality         Status                     ---------                               -----------         ------                     CBC Auto Differential[[id number]]        Abnormal            Final result               Scan Slide[[id number]]                                       Final result                 Please view results for these tests on the individual orders.    Comprehensive Metabolic Panel [[id number]]  (Abnormal) Collected: 03/09/25 1707    Specimen: Blood Updated: 03/09/25 1737     Glucose 124 mg/dL      BUN 12 mg/dL      Creatinine 0.83 mg/dL      Sodium 138 mmol/L      Potassium 3.6 mmol/L      Comment: Slight hemolysis detected by analyzer. Result may be falsely elevated.        Chloride 102 mmol/L      CO2 22.6 mmol/L      Calcium 9.7 mg/dL      Total Protein 8.2 g/dL      Albumin 4.4 g/dL      ALT (SGPT) 18 U/L      AST (SGOT) 28 U/L      Alkaline Phosphatase 126 U/L      Total Bilirubin 0.9 mg/dL      Globulin 3.8 gm/dL      A/G Ratio 1.2 g/dL      BUN/Creatinine Ratio 14.5     Anion Gap 13.4 mmol/L      eGFR 84.4 mL/min/1.73     Narrative:      GFR Categories in Chronic Kidney Disease (CKD)      GFR Category          GFR (mL/min/1.73)    Interpretation  G1                     90 or greater         Normal or high (1)  G2                      60-89                Mild decrease (1)  G3a                   45-59                Mild to moderate decrease  G3b                   30-44                 Moderate to severe decrease  G4                    15-29                Severe decrease  G5                    14 or less           Kidney failure          (1)In the absence of evidence of kidney disease, neither GFR category G1 or G2 fulfill the criteria for CKD.    eGFR calculation 2021 CKD-EPI creatinine equation, which does not include race as a factor    Lipase [[id number]]  (Normal) Collected: 03/09/25 1707    Specimen: Blood Updated: 03/09/25 1737     Lipase 34 U/L     BNP [[id number]]  (Normal) Collected: 03/09/25 1707    Specimen: Blood Updated: 03/09/25 1735     proBNP 224.6 pg/mL     Narrative:      This assay is used as an aid in the diagnosis of individuals suspected of having heart failure. It can be used as an aid in the diagnosis of acute decompensated heart failure (ADHF) in patients presenting with signs and symptoms of ADHF to the emergency department (ED). In addition, NT-proBNP of <300 pg/mL indicates ADHF is not likely.    Age Range Result Interpretation  NT-proBNP Concentration (pg/mL:      <50             Positive            >450                   Gray                 300-450                    Negative             <300    50-75           Positive            >900                  Gray                300-900                  Negative            <300      >75             Positive            >1800                  Gray                300-1800                  Negative            <300    Magnesium [[id number]]  (Normal) Collected: 03/09/25 1707    Specimen: Blood Updated: 03/09/25 1737     Magnesium 1.8 mg/dL     CBC Auto Differential [[id number]]  (Abnormal) Collected: 03/09/25 1707    Specimen: Blood Updated: 03/09/25 1735     WBC 5.44 10*3/mm3      RBC 5.35 10*6/mm3      Hemoglobin 11.9 g/dL      Hematocrit 38.5 %      MCV 72.0 fL      MCH 22.2 pg      MCHC 30.9 g/dL      RDW 14.6 %      RDW-SD 37.2 fl      MPV 9.5 fL      Platelets 346 10*3/mm3      Neutrophil % 55.7 %      Lymphocyte % 35.8 %   "    Monocyte % 7.0 %      Eosinophil % 0.2 %      Basophil % 0.7 %      Immature Grans % 0.6 %      Neutrophils, Absolute 3.03 10*3/mm3      Lymphocytes, Absolute 1.95 10*3/mm3      Monocytes, Absolute 0.38 10*3/mm3      Eosinophils, Absolute 0.01 10*3/mm3      Basophils, Absolute 0.04 10*3/mm3      Immature Grans, Absolute 0.03 10*3/mm3      nRBC 0.0 /100 WBC     Scan Slide [[id number]] Collected: 03/09/25 1707    Specimen: Blood Updated: 03/09/25 1735     Microcytes Slight/1+     Ovalocytes Slight/1+     WBC Morphology Normal     Platelet Estimate Adequate    D-dimer, Quantitative [[id number]]  (Normal) Collected: 03/09/25 1707    Specimen: Blood Updated: 03/09/25 1822     D-Dimer, Quantitative 0.45 MCGFEU/mL     Narrative:      According to the assay 's published package insert, a normal (<0.50 MCGFEU/mL) D-dimer result in conjunction with a non-high clinical probability assessment, excludes deep vein thrombosis (DVT) and pulmonary embolism (PE) with high sensitivity.    D-dimer values increase with age and this can make VTE exclusion of an older population difficult. To address this, the [employer], based on best available evidence and recent guidelines, recommends that clinicians use age-adjusted D-dimer thresholds in patients greater than 50 years of age with: a) a low probability of PE who do not meet all Pulmonary Embolism Rule Out Criteria, or b) in those with intermediate probability of PE.   The formula for an age-adjusted D-dimer cut-off is \"age/100\".  For example, a 60 year old patient would have an age-adjusted cut-off of 0.60 MCGFEU/mL and an 80 year old 0.80 MCGFEU/mL.    TSH Rfx On Abnormal To Free T4 [[id number]]  (Abnormal) Collected: 03/09/25 1707    Specimen: Blood Updated: 03/09/25 1835     TSH 0.182 uIU/mL     T4, Free [[id number]] Collected: 03/09/25 1707    Specimen: Blood Updated: 03/09/25 1835    COVID-19, FLU A/B, RSV PCR 1 HR TAT - Swab, Nasopharynx " [[id number]]  (Normal) Collected: 03/09/25 1836    Specimen: Swab from Nasopharynx Updated: 03/09/25 1927     COVID19 Not Detected     Influenza A PCR Not Detected     Influenza B PCR Not Detected     RSV, PCR Not Detected    Narrative:      Fact sheet for providers: https://www.fda.gov/media/649424/download    Fact sheet for patients: https://www.fda.gov/media/061916/download    Test performed by PCR.    High Sensitivity Troponin T 1Hr [[id number]]  (Normal) Collected: 03/09/25 1849    Specimen: Blood Updated: 03/09/25 1915     HS Troponin T 7 ng/L      Troponin T Numeric Delta -1 ng/L     Narrative:      High Sensitive Troponin T Reference Range:  <14.0 ng/L- Negative Female for AMI  <22.0 ng/L- Negative Male for AMI  >=14 - Abnormal Female indicating possible myocardial injury.  >=22 - Abnormal Male indicating possible myocardial injury.   Clinicians would have to utilize clinical acumen, EKG, Troponin, and serial changes to determine if it is an Acute Myocardial Infarction or myocardial injury due to an underlying chronic condition.                  Imaging:    XR Chest 1 View  Result Date: 3/9/2025  XR CHEST 1 VW Date of Exam: 3/9/2025 4:37 PM EDT Indication: Chest Pain Triage Protocol Comparison: [facility] chest x-ray 2/17/2025 Findings: Lungs are adequately expanded and appear clear. No pneumothorax or large pleural effusion is seen. Cardiomediastinal contours are stable.     Impression: No acute cardiopulmonary abnormality is identified. Electronically Signed: Nancy Mays  3/9/2025 4:48 PM EDT  Workstation ID: [id number]        Differential Diagnosis and Discussion:    Chest Pain:  Based on the patient's signs and symptoms, I considered aortic dissection, myocardial infaction, pulmonary embolism, cardiac tamponade, pericarditis, pneumothorax, musculoskeletal chest pain and other differential diagnosis as an etiology of the patient's chest pain.     PROCEDURES:    Labs were collected in the emergency department and  all labs were reviewed and interpreted by me.  X-ray were performed in the emergency department and all X-ray impressions were independently interpreted by me.  An EKG was performed and the EKG was interpreted by me.    ECG 12 Lead ED Triage Standing Order; Chest Pain   Preliminary Result   HEART LFDP=044  bpm   RR Wvcwsbzt=318  ms   AZ Aajcadbi=758  ms   P Horizontal Axis=55  deg   P Front Axis=37  deg   QRSD Interval=97  ms   QT Idtbkxad=547  ms   HTqH=378  ms   QRS Axis=3  deg   T Wave Axis=199  deg   - ABNORMAL ECG -   Sinus tachycardia   Consider anteroseptal infarct   Nonspecific T abnormalities, lateral leads   Date and Time of Study:2025-03-09 16:22:10          Procedures    MDM     Amount and/or Complexity of Data Reviewed  Decide to obtain previous medical records or to obtain history from someone other than the patient: yes                       Patient Care Considerations:    PERC: I used the PERC score to risk stratify the patient for PE and a CT of the chest was considered but ultimately not indicated in today's visit.      Consultants/Shared Management Plan:    None    Social Determinants of Health:    Patient is independent, reliable, and has access to care.       Disposition and Care Coordination:    Discharged: I considered escalation of care by admitting this patient to the hospital, however troponin is negative x 2.  There is no evidence of myocardial infarction.  D-dimer negative.  Reassuring vital signs.    I have explained the patient´s condition, diagnoses and treatment plan based on the information available to me at this time. I have answered questions and addressed any concerns. The patient has a good  understanding of the patient´s diagnosis, condition, and treatment plan as can be expected at this point. The vital signs have been stable. The patient´s condition is stable and appropriate for discharge from the emergency department.      The patient will pursue further outpatient  evaluation with the primary care physician or other designated or consulting physician as outlined in the discharge instructions. They are agreeable to this plan of care and follow-up instructions have been explained in detail. The patient has received these instructions in written format and has expressed an understanding of the discharge instructions. The patient is aware that any significant change in condition or worsening of symptoms should prompt an immediate return to this or the closest emergency department or call to 911.    Final diagnoses:   Nonspecific chest pain        ED Disposition       ED Disposition   Discharge    Condition   Stable    Comment   --               This medical record created using voice recognition software.             Dewayne Freitas MD  03/09/25 2000

## 2025-04-09 LAB
QT INTERVAL: 373 MS
QTC INTERVAL: 504 MS

## 2025-04-30 ENCOUNTER — TELEPHONE (OUTPATIENT)
Dept: CARDIOLOGY | Facility: CLINIC | Age: 55
End: 2025-04-30
Payer: COMMERCIAL

## 2025-04-30 NOTE — TELEPHONE ENCOUNTER
Procedure: Colonoscopy and/or EGD     Med Directive: Eliquis (2 days)     PMH: Paroxysmal SVT, pacemaker, dizziness     Last Seen: 12/06/2024

## 2025-05-01 NOTE — TELEPHONE ENCOUNTER
The patient has undergone cardiovascular evaluation from a cardiac standpoint only. Patient is low risk and is ok to proceed.  Hold Eliquis for 2 days prior to the procedure.

## 2025-05-02 ENCOUNTER — APPOINTMENT (OUTPATIENT)
Dept: GENERAL RADIOLOGY | Facility: HOSPITAL | Age: 55
End: 2025-05-02
Payer: COMMERCIAL

## 2025-05-02 ENCOUNTER — HOSPITAL ENCOUNTER (EMERGENCY)
Facility: HOSPITAL | Age: 55
Discharge: HOME | End: 2025-05-02
Attending: EMERGENCY MEDICINE
Payer: COMMERCIAL

## 2025-05-02 VITALS
OXYGEN SATURATION: 99 % | DIASTOLIC BLOOD PRESSURE: 84 MMHG | RESPIRATION RATE: 18 BRPM | TEMPERATURE: 98 F | HEIGHT: 70 IN | SYSTOLIC BLOOD PRESSURE: 135 MMHG | HEART RATE: 72 BPM | BODY MASS INDEX: 34.92 KG/M2

## 2025-05-02 DIAGNOSIS — M25.562 ACUTE PAIN OF LEFT KNEE: ICD-10-CM
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
## 2025-05-02 DIAGNOSIS — S80.02XA CONTUSION OF LEFT KNEE, INITIAL ENCOUNTER: Primary | ICD-10-CM

## 2025-05-02 PROCEDURE — 25010000002 KETOROLAC TROMETHAMINE PER 15 MG

## 2025-05-02 PROCEDURE — 73562 X-RAY EXAM OF KNEE 3: CPT

## 2025-05-02 PROCEDURE — 96372 THER/PROPH/DIAG INJ SC/IM: CPT

## 2025-05-02 PROCEDURE — 99283 EMERGENCY DEPT VISIT LOW MDM: CPT

## 2025-05-02 RX ORDER — HYDROCODONE BITARTRATE AND ACETAMINOPHEN 5; 325 MG/1; MG/1
1 TABLET ORAL ONCE AS NEEDED
Refills: 0 | Status: COMPLETED | OUTPATIENT
Start: 2025-05-02 | End: 2025-05-02

## 2025-05-02 RX ORDER — KETOROLAC TROMETHAMINE 30 MG/ML
30 INJECTION, SOLUTION INTRAMUSCULAR; INTRAVENOUS ONCE
Status: COMPLETED | OUTPATIENT
Start: 2025-05-02 | End: 2025-05-02

## 2025-05-02 RX ORDER — DICLOFENAC SODIUM 75 MG/1
75 TABLET, DELAYED RELEASE ORAL 2 TIMES DAILY
Qty: 14 TABLET | Refills: 0 | Status: SHIPPED | OUTPATIENT
Start: 2025-05-02 | End: 2025-05-09

## 2025-05-02 RX ADMIN — HYDROCODONE BITARTRATE AND ACETAMINOPHEN 1 TABLET: 5; 325 TABLET ORAL at 13:53

## 2025-05-02 RX ADMIN — KETOROLAC TROMETHAMINE 30 MG: 30 INJECTION, SOLUTION INTRAMUSCULAR; INTRAVENOUS at 13:07

## 2025-05-02 NOTE — SIGNIFICANT NOTE
Pt is unable to tolerate physical therapy evaluation as she has significant pain with palpation and any movement of her knee.      Catie Kramer, PT, DPT

## 2025-05-02 NOTE — ED PROVIDER NOTES
Time: 12:37 PM EDT  Date of encounter:  5/2/2025  Independent Historian/Clinical History and Information was obtained by:   Patient    History is limited by: N/A    Chief Complaint: Fall      History of Present Illness:  Patient is a 55 y.o. year old female who presents to the emergency department for evaluation of fall.  Patient reports she was carrying her 3-month-old grandbaby, tripped and fell.  States that she was able to prevent the grandbaby from getting any injuries however patient hit her left knee and right shin on an oak bookcase.  She reports immediate pain and swelling mostly to the left knee.  She reports 2 kn to the underside of her knee.  States that she is on blood thinners for A-fib.  States that she is having a lot of pain to the left knee and cannot move it.  Reports numbness down to the 4 toes on the left foot which is abnormal for her, states she can still move them but is having difficulty feeling them.  She denies any back injury or head injury.  No saddle anesthesia.  Has some pain to the right lower leg but is able to bear weight on it.      Patient Care Team  Primary Care Provider: Provider, No Known    Past Medical History:     Allergies   Allergen Reactions    Fentanyl Hives    Promethazine Itching and Rash     Past Medical History:   Diagnosis Date    A-fib     Atrial fibrillation with RVR     CHF (congestive heart failure)     DM (diabetes mellitus)     HTN (hypertension)     SVT (supraventricular tachycardia)      Past Surgical History:   Procedure Laterality Date    CARDIAC CATHETERIZATION N/A 10/10/2024    Procedure: Left Heart Cath with possible coronary angioplasty;  Surgeon: Harriet Perez MD;  Location: [facility] CATH INVASIVE LOC[facility];  Service: Cardiology;  Laterality: N/A;    GALLBLADDER SURGERY      HERNIA REPAIR      HYSTERECTOMY      PACEMAKER IMPLANTATION  2014     History reviewed. No pertinent family history.    Home Medications:  Prior to Admission medications     Medication Sig Start Date End Date Taking? Authorizing Provider   atorvastatin (LIPITOR) 10 MG tablet Take 1 tablet by mouth Daily. 1/15/25   Harriet Perez MD   benzonatate (TESSALON) 100 MG capsule Take 1 capsule by mouth 3 (Three) Times a Day As Needed for Cough. 2/17/25   Qi Botello APRN   Continuous Glucose Sensor (FreeStyle Anupam 3 Plus Sensor) USE 1 SENSOR EVERY 15 DAYS AS DIRECTED 11/17/24   Capri Dewey MD   Creon 03236-08770 units capsule delayed-release particles capsule Take 2 capsules by mouth 3 (Three) Times a Day With Meals. 9/16/24   Capri Dewey MD   dapagliflozin Propanediol (Farxiga) 10 MG tablet Take 10 mg by mouth Daily.    Capri Dewey MD   dilTIAZem CD (CARDIZEM CD) 300 MG 24 hr capsule Take 1 capsule by mouth Daily. 1/15/25   Harriet Perez MD   Eliquis 5 MG tablet tablet Take 1 tablet by mouth 2 (Two) Times a Day. 1/15/25   Harriet Perez MD   empagliflozin (JARDIANCE) 25 MG tablet tablet Take 1 tablet by mouth Daily. 11/21/24 5/20/25  Capri Dewey MD   furosemide (LASIX) 20 MG tablet Take 1 tablet by mouth Daily. 1/15/25   Harriet Perez MD   omeprazole (priLOSEC) 40 MG capsule Take 1 capsule by mouth Daily.    Capri Dewey MD   pancrelipase, Lip-Prot-Amyl, (CREON) 69617-08439 units capsule delayed-release particles capsule Take 1 capsule by mouth With Snacks for Snacks.    Capri Dewey MD   potassium chloride 10 MEQ CR tablet Take 1 tablet by mouth Daily. 1/15/25   Harriet Perez MD   spironolactone (ALDACTONE) 25 MG tablet Take 1 tablet by mouth Daily. 1/15/25   Harriet Peerz MD   sucralfate (CARAFATE) 1 g tablet Take 1 tablet by mouth 3 (Three) Times a Day With Meals. May dissolve tablets in a small amount of water to help with swallowing 3/9/25   Dewayen Freitas MD   Tirzepatide (Mounjaro) 7.5 MG/0.5ML solution auto-injector Inject 7.5 mg under the skin into the appropriate area as directed Every 7  "(Seven) Days. 11/21/24   Provider, MD Capri        Social History:   Social History     Tobacco Use    Smoking status: Never    Smokeless tobacco: Never   Vaping Use    Vaping status: Never Used   Substance Use Topics    Alcohol use: Never    Drug use: Never         Review of Systems:  Review of Systems     Physical Exam:  /84 (BP Location: Left arm, Patient Position: Sitting)   Pulse 72   Temp 98 °F (36.7 °C) (Oral)   Resp 18   Ht 177.8 cm (70\")   SpO2 99%   BMI 34.92 kg/m²     Physical Exam  HENT:      Head: Normocephalic.      Mouth/Throat:      Mouth: Mucous membranes are moist.   Eyes:      Pupils: Pupils are equal, round, and reactive to light.   Pulmonary:      Effort: Pulmonary effort is normal.   Abdominal:      General: There is no distension.   Musculoskeletal:         General: Swelling and tenderness present.      Cervical back: Neck supple.      Comments: Tenderness and swelling to the left knee.  Full range of motion at ankle, CMS is intact.  Able to wiggle toes.  Subjective numbness to lateral 4 toes.    Small area of erythema to lateral right anterior shin.  Mild tenderness to palpation, no obvious deformity, CMS intact.   Skin:     General: Skin is warm and dry.   Neurological:      General: No focal deficit present.      Mental Status: She is alert and oriented to person, place, and time.   Psychiatric:         Mood and Affect: Mood normal.         Behavior: Behavior normal.                    Medical Decision Making:      Comorbidities that affect care:    A-fib, CHF, DM, HTN, SVT    External Notes reviewed:    Previous ED Note: Seen on 3/9/2025 for chest pain      The following orders were placed and all results were independently analyzed by me:  Orders Placed This Encounter   Procedures    [facility] 13. Crutches (), 06. Hinged Knee (); Left; Left sided injury    XR Knee 3 View Left    Ambulatory Referral to Orthopedic Surgery    Obtain & Apply The Following- " Lower extremity; Hinged knee brace       Medications Given in the Emergency Department:  Medications   ketorolac (TORADOL) injection 30 mg (30 mg Intramuscular Given 5/2/25 7526)   HYDROcodone-acetaminophen (NORCO) 5-325 MG per tablet 1 tablet (1 tablet Oral Given 5/2/25 5343)        ED Course:         Labs:    Lab Results (last 24 hours)       ** No results found for the last 24 hours. **             Imaging:    XR Knee 3 View Left  Result Date: 5/2/2025  XR KNEE 3 VW LEFT Date of Exam: 5/2/2025 12:48 PM EDT Indication: fall Comparison: None available. Findings: Evaluation of the left knee shows no evidence for acute fracture or acute alignment abnormality. There is early medial compartment joint space narrowing and osteophyte formation. No underlying joint effusion. Superficial varices are suspected.     Impression: No acute osseous injury to the left knee. Electronically Signed: Lety Stevenson MD  5/2/2025 1:11 PM EDT  Workstation ID: [id number]        Differential Diagnosis and Discussion:    Orthopedic Injuries: Differential diagnosis includes but is not limited to fractures, soft tissue injuries, dislocations, contusions, ligamentous injuries, tendon injuries, nerve injuries, compartment syndrome, bursitis, and vascular injuries.    PROCEDURES:    X-ray were performed in the emergency department and all X-ray impressions were independently interpreted by me.    No orders to display       Procedures    MDM  Number of Diagnoses or Management Options  Acute pain of left knee  Contusion of left knee, initial encounter  Fall, initial encounter  Diagnosis management comments: The patient's symptoms are consistent with a strain vs. sprain.     A muscle strain, also known as a pulled muscle, is an injury that occurs when a muscle is overstretched or torn, often as a result of fatigue, overuse, or improper use. This can result in pain, swelling, and a limited range of motion.    A sprain, on the other hand, is an  injury to a ligament, which is the tissue that connects bones to each other. Sprains often occur in joints like the ankle or wrist when they are twisted or impacted in a way that stretches or tears the ligaments. Symptoms of a sprain can include pain, swelling, bruising, and a decreased ability to move the joint.    The patient was counseled to use rest, ice, and elevation and follow-up with their PCP or an orthopedic surgeon.       Amount and/or Complexity of Data Reviewed  Tests in the radiology section of CPT®: reviewed and ordered  Independent visualization of images, tracings, or specimens: yes                       Patient Care Considerations:    SEPSIS was considered but is NOT present in the emergency department as SIRS criteria is not present.      Consultants/Shared Management Plan:    Patient was evaluated by ED physical therapist who recommends hinged knee brace with crutches.  Likely has contusion causing her pain.    Social Determinants of Health:    Patient is independent, reliable, and has access to care.       Disposition and Care Coordination:    Discharged: The patient is suitable and stable for discharge with no need for consideration of admission.    I have explained the patient´s condition, diagnoses and treatment plan based on the information available to me at this time. I have answered questions and addressed any concerns. The patient has a good  understanding of the patient´s diagnosis, condition, and treatment plan as can be expected at this point. The vital signs have been stable. The patient´s condition is stable and appropriate for discharge from the emergency department.      The patient will pursue further outpatient evaluation with the primary care physician or other designated or consulting physician as outlined in the discharge instructions. They are agreeable to this plan of care and follow-up instructions have been explained in detail. The patient has received these instructions  in written format and has expressed an understanding of the discharge instructions. The patient is aware that any significant change in condition or worsening of symptoms should prompt an immediate return to this or the closest emergency department or call to 911.  I have explained discharge medications and the need for follow up with the patient/caretakers. This was also printed in the discharge instructions. Patient was discharged with the following medications and follow up:      Medication List        New Prescriptions      diclofenac 75 MG EC tablet  Commonly known as: VOLTAREN  Take 1 tablet by mouth 2 (Two) Times a Day for 7 days.               Where to Get Your Medications        These medications were sent to [facility] [address] [place] [address], [place] [address] [facility] [phone] [facility] [phone] [address] [facility] [address]      Phone: [phone]   diclofenac 75 MG EC tablet      No follow-up provider specified.     Final diagnoses:   Contusion of left knee, initial encounter   Fall, initial encounter   Acute pain of left knee        ED Disposition       ED Disposition   Discharge    Condition   Stable    Comment   --               This medical record created using voice recognition software.             Yusra Bautista PA-C  05/02/25 7526

## 2025-05-02 NOTE — ED PROVIDER NOTES
"SHARED VISIT NOTE:    Patient is 55 y.o. year old female that presents to the ED for evaluation of fall today, left knee injury with pain and swelling.  On blood thinners..     Physical Exam    ED Course:    /99 (BP Location: Left arm, Patient Position: Sitting)   Pulse 76   Temp 98.3 °F (36.8 °C) (Oral)   Resp 18   Ht 177.8 cm (70\")   SpO2 100%   BMI 34.92 kg/m²   Results for orders placed or performed during the hospital encounter of 03/09/25   ECG 12 Lead ED Triage Standing Order; Chest Pain    Collection Time: 03/09/25  4:22 PM   Result Value Ref Range    QT Interval 373 ms    QTC Interval 504 ms   High Sensitivity Troponin T    Collection Time: 03/09/25  5:07 PM    Specimen: Blood   Result Value Ref Range    HS Troponin T 8 <14 ng/L   Comprehensive Metabolic Panel    Collection Time: 03/09/25  5:07 PM    Specimen: Blood   Result Value Ref Range    Glucose 124 (H) 65 - 99 mg/dL    BUN 12 6 - 20 mg/dL    Creatinine 0.83 0.57 - 1.00 mg/dL    Sodium 138 136 - 145 mmol/L    Potassium 3.6 3.5 - 5.2 mmol/L    Chloride 102 98 - 107 mmol/L    CO2 22.6 22.0 - 29.0 mmol/L    Calcium 9.7 8.6 - 10.5 mg/dL    Total Protein 8.2 6.0 - 8.5 g/dL    Albumin 4.4 3.5 - 5.2 g/dL    ALT (SGPT) 18 1 - 33 U/L    AST (SGOT) 28 1 - 32 U/L    Alkaline Phosphatase 126 (H) 39 - 117 U/L    Total Bilirubin 0.9 0.0 - 1.2 mg/dL    Globulin 3.8 gm/dL    A/G Ratio 1.2 g/dL    BUN/Creatinine Ratio 14.5 7.0 - 25.0    Anion Gap 13.4 5.0 - 15.0 mmol/L    eGFR 84.4 >60.0 mL/min/1.73   Lipase    Collection Time: 03/09/25  5:07 PM    Specimen: Blood   Result Value Ref Range    Lipase 34 13 - 60 U/L   BNP    Collection Time: 03/09/25  5:07 PM    Specimen: Blood   Result Value Ref Range    proBNP 224.6 0.0 - 900.0 pg/mL   Magnesium    Collection Time: 03/09/25  5:07 PM    Specimen: Blood   Result Value Ref Range    Magnesium 1.8 1.6 - 2.6 mg/dL   CBC Auto Differential    Collection Time: 03/09/25  5:07 PM    Specimen: Blood   Result Value Ref " Range    WBC 5.44 3.40 - 10.80 10*3/mm3    RBC 5.35 (H) 3.77 - 5.28 10*6/mm3    Hemoglobin 11.9 (L) 12.0 - 15.9 g/dL    Hematocrit 38.5 34.0 - 46.6 %    MCV 72.0 (L) 79.0 - 97.0 fL    MCH 22.2 (L) 26.6 - 33.0 pg    MCHC 30.9 (L) 31.5 - 35.7 g/dL    RDW 14.6 12.3 - 15.4 %    RDW-SD 37.2 37.0 - 54.0 fl    MPV 9.5 6.0 - 12.0 fL    Platelets 346 140 - 450 10*3/mm3    Neutrophil % 55.7 42.7 - 76.0 %    Lymphocyte % 35.8 19.6 - 45.3 %    Monocyte % 7.0 5.0 - 12.0 %    Eosinophil % 0.2 (L) 0.3 - 6.2 %    Basophil % 0.7 0.0 - 1.5 %    Immature Grans % 0.6 (H) 0.0 - 0.5 %    Neutrophils, Absolute 3.03 1.70 - 7.00 10*3/mm3    Lymphocytes, Absolute 1.95 0.70 - 3.10 10*3/mm3    Monocytes, Absolute 0.38 0.10 - 0.90 10*3/mm3    Eosinophils, Absolute 0.01 0.00 - 0.40 10*3/mm3    Basophils, Absolute 0.04 0.00 - 0.20 10*3/mm3    Immature Grans, Absolute 0.03 0.00 - 0.05 10*3/mm3    nRBC 0.0 0.0 - 0.2 /100 WBC   Scan Slide    Collection Time: 03/09/25  5:07 PM    Specimen: Blood   Result Value Ref Range    Microcytes Slight/1+ None Seen    Ovalocytes Slight/1+ None Seen    WBC Morphology Normal Normal    Platelet Estimate Adequate Normal   D-dimer, Quantitative    Collection Time: 03/09/25  5:07 PM    Specimen: Blood   Result Value Ref Range    D-Dimer, Quantitative 0.45 0.00 - 0.53 MCGFEU/mL   TSH Rfx On Abnormal To Free T4    Collection Time: 03/09/25  5:07 PM    Specimen: Blood   Result Value Ref Range    TSH 0.182 (L) 0.270 - 4.200 uIU/mL   T4, Free    Collection Time: 03/09/25  5:07 PM    Specimen: Blood   Result Value Ref Range    Free T4 2.28 (H) 0.92 - 1.68 ng/dL   Green Top (Gel)    Collection Time: 03/09/25  5:07 PM   Result Value Ref Range    Extra Tube Hold for add-ons.    Lavender Top    Collection Time: 03/09/25  5:07 PM   Result Value Ref Range    Extra Tube hold for add-on    Gold Top - SST    Collection Time: 03/09/25  5:07 PM   Result Value Ref Range    Extra Tube Hold for add-ons.    Light Blue Top    Collection  Time: 03/09/25  5:07 PM   Result Value Ref Range    Extra Tube Hold for add-ons.    COVID-19, FLU A/B, RSV PCR 1 HR TAT - Swab, Nasopharynx    Collection Time: 03/09/25  6:36 PM    Specimen: Nasopharynx; Swab   Result Value Ref Range    COVID19 Not Detected Not Detected - Ref. Range    Influenza A PCR Not Detected Not Detected    Influenza B PCR Not Detected Not Detected    RSV, PCR Not Detected Not Detected   High Sensitivity Troponin T 1Hr    Collection Time: 03/09/25  6:49 PM    Specimen: Blood   Result Value Ref Range    HS Troponin T 7 <14 ng/L    Troponin T Numeric Delta -1 Abnormal if >/=3 ng/L     Medications   ketorolac (TORADOL) injection 30 mg (30 mg Intramuscular Given 5/2/25 4207)   HYDROcodone-acetaminophen (NORCO) 5-325 MG per tablet 1 tablet (1 tablet Oral Given 5/2/25 6023)     XR Knee 3 View Left  Result Date: 5/2/2025  Narrative: XR KNEE 3 VW LEFT Date of Exam: 5/2/2025 12:48 PM EDT Indication: fall Comparison: None available. Findings: Evaluation of the left knee shows no evidence for acute fracture or acute alignment abnormality. There is early medial compartment joint space narrowing and osteophyte formation. No underlying joint effusion. Superficial varices are suspected.     Impression: Impression: No acute osseous injury to the left knee. Electronically Signed: Lety Stevenson MD  5/2/2025 1:11 PM EDT  Workstation ID: [id number]      MDM:    Procedures    X-ray were performed in the emergency department and all X-ray impressions were independently interpreted by me.          SHARED VISIT ATTESTATION:    This visit was performed by both myself and an APC.  I performed the substantive portion of the medical decision making. The management plan was made or approved by me, and I take responsibility for patient management.           Cruz Fuentes MD  13:58 EDT  05/02/25         Cruz Fuentes MD  05/02/25 6674

## 2025-05-02 NOTE — Clinical Note
[facility] EMERGENCY ROOM  [address] [place] [address]  Phone: [phone]  Fax: [phone]    Bisi Gutierrez was seen and treated in our emergency department on 5/2/2025.  She may return to work on 05/05/2025.         Thank you for choosing [facility].    Cruz Fuentes MD

## 2025-05-02 NOTE — DISCHARGE INSTRUCTIONS
You were evaluated in the emergency department today.  You have a contusion of your left knee, x-rays do not show any fractures or anything out of place.  It would be very unlikely for you to develop a blood clot when you were on blood thinners with it having occurred just after your injury.  Signs of blood clots in your leg include unilateral leg swelling, redness, pain in the calf, shortness of breath or chest pain.  If you develop these, please be evaluated.  We placed you in a hinged knee brace and crutches, I have sent medication to your pharmacy.  I have placed a referral to orthopedics as you do have some arthritis in the knee which could be exacerbated by your fall.

## 2025-06-09 ENCOUNTER — TELEPHONE (OUTPATIENT)
Dept: CARDIOLOGY | Facility: CLINIC | Age: 55
End: 2025-06-09
Payer: COMMERCIAL

## 2025-06-09 NOTE — TELEPHONE ENCOUNTER
ESTEFANIA patient. Patient reports she was recently discharged from [facility] after having a blood clot in left leg. Reports left leg pain continues with redness and hot to the touch in thigh. States short of breath at times. Patient requesting hospital follow up appointment. Compliant with Eliquis 5 mg BID.      Advised to report to ER for symptoms. Patient declined. Appointment offered tomorrow, patient declined. Appointment scheduled on 6/16.

## 2025-06-15 ENCOUNTER — APPOINTMENT (OUTPATIENT)
Dept: GENERAL RADIOLOGY | Facility: HOSPITAL | Age: 55
End: 2025-06-15
Payer: MEDICAID

## 2025-06-15 ENCOUNTER — HOSPITAL ENCOUNTER (EMERGENCY)
Facility: HOSPITAL | Age: 55
Discharge: HOME | End: 2025-06-16
Attending: EMERGENCY MEDICINE | Admitting: EMERGENCY MEDICINE
Payer: MEDICAID

## 2025-06-15 DIAGNOSIS — R00.2 PALPITATIONS: ICD-10-CM

## 2025-06-15 DIAGNOSIS — R07.89 CHEST PAIN, ATYPICAL: ICD-10-CM

## 2025-06-15 DIAGNOSIS — R07.9 CHEST PAIN, UNSPECIFIED TYPE: Primary | ICD-10-CM

## 2025-06-15 LAB
ALBUMIN SERPL-MCNC: 4.4 G/DL (ref 3.5–5.2)
ALBUMIN/GLOB SERPL: 1.3 G/DL
ALP SERPL-CCNC: 115 U/L (ref 39–117)
ALT SERPL W P-5'-P-CCNC: 20 U/L (ref 1–33)
ANION GAP SERPL CALCULATED.3IONS-SCNC: 14.6 MMOL/L (ref 5–15)
AST SERPL-CCNC: 20 U/L (ref 1–32)
BASOPHILS # BLD AUTO: 0.04 10*3/MM3 (ref 0–0.2)
BASOPHILS NFR BLD AUTO: 0.7 % (ref 0–1.5)
BILIRUB SERPL-MCNC: 0.9 MG/DL (ref 0–1.2)
BUN SERPL-MCNC: 11.4 MG/DL (ref 6–20)
BUN/CREAT SERPL: 10.1 (ref 7–25)
CALCIUM SPEC-SCNC: 9.5 MG/DL (ref 8.6–10.5)
CHLORIDE SERPL-SCNC: 104 MMOL/L (ref 98–107)
CO2 SERPL-SCNC: 22.4 MMOL/L (ref 22–29)
CREAT SERPL-MCNC: 1.13 MG/DL (ref 0.57–1)
DEPRECATED RDW RBC AUTO: 42.2 FL (ref 37–54)
EGFRCR SERPLBLD CKD-EPI 2021: 58.3 ML/MIN/1.73
EOSINOPHIL # BLD AUTO: 0.11 10*3/MM3 (ref 0–0.4)
EOSINOPHIL NFR BLD AUTO: 2 % (ref 0.3–6.2)
ERYTHROCYTE [DISTWIDTH] IN BLOOD BY AUTOMATED COUNT: 15.5 % (ref 12.3–15.4)
GLOBULIN UR ELPH-MCNC: 3.4 GM/DL
GLUCOSE SERPL-MCNC: 150 MG/DL (ref 65–99)
HCT VFR BLD AUTO: 42.5 % (ref 34–46.6)
HGB BLD-MCNC: 12.9 G/DL (ref 12–15.9)
HOLD SPECIMEN: NORMAL
HOLD SPECIMEN: NORMAL
IMM GRANULOCYTES # BLD AUTO: 0.01 10*3/MM3 (ref 0–0.05)
IMM GRANULOCYTES NFR BLD AUTO: 0.2 % (ref 0–0.5)
LYMPHOCYTES # BLD AUTO: 2.67 10*3/MM3 (ref 0.7–3.1)
LYMPHOCYTES NFR BLD AUTO: 48 % (ref 19.6–45.3)
MCH RBC QN AUTO: 23.2 PG (ref 26.6–33)
MCHC RBC AUTO-ENTMCNC: 30.4 G/DL (ref 31.5–35.7)
MCV RBC AUTO: 76.4 FL (ref 79–97)
MONOCYTES # BLD AUTO: 0.44 10*3/MM3 (ref 0.1–0.9)
MONOCYTES NFR BLD AUTO: 7.9 % (ref 5–12)
NEUTROPHILS NFR BLD AUTO: 2.29 10*3/MM3 (ref 1.7–7)
NEUTROPHILS NFR BLD AUTO: 41.2 % (ref 42.7–76)
NRBC BLD AUTO-RTO: 0 /100 WBC (ref 0–0.2)
NT-PROBNP SERPL-MCNC: 52.8 PG/ML (ref 0–900)
PLATELET # BLD AUTO: 297 10*3/MM3 (ref 140–450)
PMV BLD AUTO: 9.9 FL (ref 6–12)
POTASSIUM SERPL-SCNC: 4.2 MMOL/L (ref 3.5–5.2)
PROT SERPL-MCNC: 7.8 G/DL (ref 6–8.5)
QT INTERVAL: 370 MS
QTC INTERVAL: 458 MS
RBC # BLD AUTO: 5.56 10*6/MM3 (ref 3.77–5.28)
SODIUM SERPL-SCNC: 141 MMOL/L (ref 136–145)
TROPONIN T SERPL HS-MCNC: <6 NG/L
WBC NRBC COR # BLD AUTO: 5.56 10*3/MM3 (ref 3.4–10.8)
WHOLE BLOOD HOLD COAG: NORMAL
WHOLE BLOOD HOLD SPECIMEN: NORMAL

## 2025-06-15 PROCEDURE — 84484 ASSAY OF TROPONIN QUANT: CPT | Performed by: EMERGENCY MEDICINE

## 2025-06-15 PROCEDURE — 80053 COMPREHEN METABOLIC PANEL: CPT | Performed by: EMERGENCY MEDICINE

## 2025-06-15 PROCEDURE — 85025 COMPLETE CBC W/AUTO DIFF WBC: CPT | Performed by: EMERGENCY MEDICINE

## 2025-06-15 PROCEDURE — 99284 EMERGENCY DEPT VISIT MOD MDM: CPT

## 2025-06-15 PROCEDURE — 71045 X-RAY EXAM CHEST 1 VIEW: CPT

## 2025-06-15 PROCEDURE — 93005 ELECTROCARDIOGRAM TRACING: CPT

## 2025-06-15 PROCEDURE — 93005 ELECTROCARDIOGRAM TRACING: CPT | Performed by: EMERGENCY MEDICINE

## 2025-06-15 PROCEDURE — 85652 RBC SED RATE AUTOMATED: CPT | Performed by: INTERNAL MEDICINE

## 2025-06-15 PROCEDURE — 36415 COLL VENOUS BLD VENIPUNCTURE: CPT

## 2025-06-15 PROCEDURE — 83880 ASSAY OF NATRIURETIC PEPTIDE: CPT | Performed by: EMERGENCY MEDICINE

## 2025-06-15 PROCEDURE — 36415 COLL VENOUS BLD VENIPUNCTURE: CPT | Performed by: EMERGENCY MEDICINE

## 2025-06-15 RX ORDER — SODIUM CHLORIDE 0.9 % (FLUSH) 0.9 %
10 SYRINGE (ML) INJECTION AS NEEDED
Status: DISCONTINUED | OUTPATIENT
Start: 2025-06-15 | End: 2025-06-16 | Stop reason: HOSPADM

## 2025-06-16 ENCOUNTER — CLINICAL SUPPORT NO REQUIREMENTS (OUTPATIENT)
Dept: CARDIOLOGY | Facility: CLINIC | Age: 55
End: 2025-06-16
Payer: MEDICAID

## 2025-06-16 ENCOUNTER — OFFICE VISIT (OUTPATIENT)
Dept: CARDIOLOGY | Facility: CLINIC | Age: 55
End: 2025-06-16
Payer: MEDICAID

## 2025-06-16 VITALS
WEIGHT: 244.71 LBS | HEIGHT: 70 IN | HEART RATE: 87 BPM | TEMPERATURE: 98 F | SYSTOLIC BLOOD PRESSURE: 113 MMHG | RESPIRATION RATE: 18 BRPM | BODY MASS INDEX: 35.03 KG/M2 | OXYGEN SATURATION: 95 % | DIASTOLIC BLOOD PRESSURE: 80 MMHG

## 2025-06-16 VITALS
HEIGHT: 70 IN | DIASTOLIC BLOOD PRESSURE: 88 MMHG | SYSTOLIC BLOOD PRESSURE: 137 MMHG | HEART RATE: 84 BPM | BODY MASS INDEX: 33.31 KG/M2 | WEIGHT: 232.7 LBS | OXYGEN SATURATION: 99 %

## 2025-06-16 DIAGNOSIS — Z45.018 ENCOUNTER FOR INTERROGATION OF CARDIAC PACEMAKER: ICD-10-CM

## 2025-06-16 DIAGNOSIS — R00.2 PALPITATIONS: Primary | ICD-10-CM

## 2025-06-16 DIAGNOSIS — I48.0 PAROXYSMAL ATRIAL FIBRILLATION: Primary | ICD-10-CM

## 2025-06-16 DIAGNOSIS — R07.89 CHEST PAIN, ATYPICAL: ICD-10-CM

## 2025-06-16 LAB
CRP SERPL-MCNC: 0.62 MG/DL (ref 0–0.5)
ERYTHROCYTE [SEDIMENTATION RATE] IN BLOOD: 22 MM/HR (ref 0–30)
GEN 5 1HR TROPONIN T REFLEX: <6 NG/L
TROPONIN T NUMERIC DELTA: NORMAL

## 2025-06-16 PROCEDURE — 99214 OFFICE O/P EST MOD 30 MIN: CPT | Performed by: INTERNAL MEDICINE

## 2025-06-16 PROCEDURE — 86140 C-REACTIVE PROTEIN: CPT | Performed by: INTERNAL MEDICINE

## 2025-06-16 PROCEDURE — 84484 ASSAY OF TROPONIN QUANT: CPT | Performed by: EMERGENCY MEDICINE

## 2025-06-16 RX ORDER — COLESTIPOL HYDROCHLORIDE 1 G/1
TABLET ORAL
COMMUNITY
End: 2025-06-16

## 2025-06-16 RX ORDER — SOTALOL HYDROCHLORIDE 80 MG/1
TABLET ORAL EVERY 12 HOURS SCHEDULED
COMMUNITY
End: 2025-06-16

## 2025-06-16 RX ORDER — SEMAGLUTIDE 0.68 MG/ML
INJECTION, SOLUTION SUBCUTANEOUS
COMMUNITY

## 2025-06-16 RX ORDER — POLYETHYLENE GLYCOL 3350, SODIUM SULFATE ANHYDROUS, SODIUM BICARBONATE, SODIUM CHLORIDE, POTASSIUM CHLORIDE 236; 22.74; 6.74; 5.86; 2.97 G/4L; G/4L; G/4L; G/4L; G/4L
POWDER, FOR SOLUTION ORAL SEE ADMIN INSTRUCTIONS
COMMUNITY
Start: 2025-04-14 | End: 2025-06-16

## 2025-06-16 RX ORDER — EMPAGLIFLOZIN 25 MG/1
1 TABLET, FILM COATED ORAL DAILY
COMMUNITY
Start: 2025-04-22

## 2025-06-16 NOTE — PROGRESS NOTES
[facility]  INTERVENTIONAL CARDIOLOGY FOLLOW-UP PROGRESS NOTE        Chief Complaint  Hospital Follow Up Visit, Shortness of Breath (With cough), and Blood Clots (leg)    Subjective            History of Present Illness  Bisi Gutierrez is a 55 y.o. female who presents to [facility] CARDIOLOGY    History of Present Illness  The patient presents for evaluation of recent ER visit for chest pain.  She has history of paroxysmal atrial fibrillation, dual-chamber pacemaker.  Patient states she was recently at [facility] and was diagnosed with blood clot in her legs.  She is on Eliquis 5 mg twice daily.   The clots were discovered following a fall she experienced on 05/2025, which resulted in leg swelling. She was taken to the hospital by her  due to the leg swelling, where the clots were identified. Initial treatment with heparin was discontinued due to the detection of blood traces in her bowel movements. She continues to receive treatment for the leg clots. She reports no current leg swelling but notes a bruise from the fall. She was advised to discuss the possibility of mesh placement with her cardiologist. She was admitted to the hospital for 2 to 3 days last month and does not remember the name of the cardiologist she saw there. She is currently on Eliquis, spironolactone, and Cardizem.    She reports experiencing chest discomfort, which she does not believe is related to atrial fibrillation, but it is causing significant soreness. She describes her chest as feeling sore and believes she may have been in atrial fibrillation earlier, but it seems to have resolved. However, she still feels that her heart rhythm is irregular, describing it as feeling like it is beating up a little and then suddenly stopping. She does not believe it feels like atrial fibrillation because when she is in atrial fibrillation, her heart rate is typically around 100. She also reports a persistent cough  but has not been exposed to any viral infections or smoking. She has a pacemaker implanted since 2014 due to atrial fibrillation. Her last device check was conducted in 2024. She was previously on sotalol but has since discontinued it.    PAST SURGICAL HISTORY:  Pacemaker implantation in 2014.    SOCIAL HISTORY  Marital Status:   Tobacco: Does not smoke cigarettes         Past History:  Past Medical History:   Diagnosis Date    A-fib     Atrial fibrillation with RVR     CHF (congestive heart failure)     DM (diabetes mellitus)     HTN (hypertension)     SVT (supraventricular tachycardia)        Medical History:  Past Medical History:   Diagnosis Date    A-fib     Atrial fibrillation with RVR     CHF (congestive heart failure)     DM (diabetes mellitus)     HTN (hypertension)     SVT (supraventricular tachycardia)        Surgical History:   has a past surgical history that includes Pacemaker Implantation (2014); Gallbladder surgery; Hysterectomy; Hernia repair; and Cardiac catheterization (N/A, 10/10/2024).     Family History:   family history is not on file.     Social History:   reports that she has never smoked. She has never been exposed to tobacco smoke. She has never used smokeless tobacco. She reports that she does not drink alcohol and does not use drugs.    Allergies:   Fentanyl and Promethazine    Current Outpatient Medications on File Prior to Visit   Medication Sig    atorvastatin (LIPITOR) 10 MG tablet Take 1 tablet by mouth Daily.    Continuous Glucose Sensor (FreeStyle Anupam 3 Plus Sensor) USE 1 SENSOR EVERY 15 DAYS AS DIRECTED    dilTIAZem CD (CARDIZEM CD) 300 MG 24 hr capsule Take 1 capsule by mouth Daily.    Eliquis 5 MG tablet tablet Take 1 tablet by mouth 2 (Two) Times a Day.    furosemide (LASIX) 20 MG tablet Take 1 tablet by mouth Daily.    Jardiance 25 MG tablet tablet Take 1 tablet by mouth Daily.    potassium chloride 10 MEQ CR tablet Take 1 tablet by mouth Daily.    spironolactone  (ALDACTONE) 25 MG tablet Take 1 tablet by mouth Daily.    Tirzepatide (Mounjaro) 7.5 MG/0.5ML solution auto-injector Inject 7.5 mg under the skin into the appropriate area as directed Every 7 (Seven) Days.    dapagliflozin Propanediol (Farxiga) 10 MG tablet Take 10 mg by mouth Daily. (Patient not taking: Reported on 6/16/2025)    Semaglutide,0.25 or 0.5MG/DOS, (Ozempic, 0.25 or 0.5 MG/DOSE,) 2 MG/3ML solution pen-injector as directed Subcutaneous weeky  0.5 mg Subcutaneous weekly (Patient not taking: Reported on 6/16/2025)    [DISCONTINUED] colestipol (COLESTID) 1 g tablet TAKE 2 TABLETS BY MOUTH TWICE DAILY START BY TAKING 2 TABLETS AT BEDTIME. INCREASE TO 2 TABLETS TWICE DAILY IF NEEDED TO CONTROL SYMPTOMS. TAKE OTHER MEDICATIONS AT LEAST 1 HOUR BEFORE OR 4 HOURS AFTER COLESTIPOL (Patient not taking: Reported on 6/16/2025)    [DISCONTINUED] Creon 40840-58242 units capsule delayed-release particles capsule Take 2 capsules by mouth 3 (Three) Times a Day With Meals. (Patient not taking: Reported on 6/16/2025)    [DISCONTINUED] omeprazole (priLOSEC) 40 MG capsule Take 1 capsule by mouth Daily. (Patient not taking: Reported on 6/16/2025)    [DISCONTINUED] pancrelipase, Lip-Prot-Amyl, (CREON) 19837-60335 units capsule delayed-release particles capsule Take 1 capsule by mouth With Snacks for Snacks. (Patient not taking: Reported on 6/16/2025)    [DISCONTINUED] PEG 3350-KCl-NaBcb-NaCl-NaSulf (PEG-3350/Electrolytes) 236 g reconstituted solution See Admin Instructions. (Patient not taking: Reported on 6/16/2025)    [DISCONTINUED] sotalol (BETAPACE) 80 MG tablet Every 12 (Twelve) Hours. (Patient not taking: Reported on 6/16/2025)    [DISCONTINUED] sucralfate (CARAFATE) 1 g tablet Take 1 tablet by mouth 3 (Three) Times a Day With Meals. May dissolve tablets in a small amount of water to help with swallowing (Patient not taking: Reported on 6/16/2025)     Current Facility-Administered Medications on File Prior to Visit  "  Medication    [DISCONTINUED] sodium chloride 0.9 % flush 10 mL          Review of Systems   Negative except as mentioned in HPI above.    Objective     /88 (BP Location: Left arm, Patient Position: Sitting, Cuff Size: Adult)   Pulse 84   Ht 177.8 cm (70\")   Wt 106 kg (232 lb 11.2 oz)   SpO2 99%   BMI 33.39 kg/m²       Physical Exam    General : Alert, awake, no acute distress  Neck : Supple, no carotid bruit, no jugular venous distention  CVS : Regular rate and rhythm, no murmur, rubs or gallops  Lungs: Clear to auscultation bilaterally, no crackles or rhonchi  Abdomen: Soft, nontender, bowel sounds heard in all 4 quadrants  Extremities: Warm, well-perfused, no pedal edema    Result Review :     The following data was reviewed by: Harriet Perez MD on 06/16/2025:    CMP          2/17/2025    13:26 3/9/2025    17:07 6/15/2025    22:11   CMP   Glucose 112  124  150    BUN 15  12  11.4    Creatinine 0.99  0.83  1.13    EGFR 68.7  84.4  58.3    Sodium 136  138  141    Potassium 4.0  3.6  4.2    Chloride 101  102  104    Calcium 9.5  9.7  9.5    Total Protein 8.2  8.2  7.8    Albumin 4.2  4.4  4.4    Globulin 4.0  3.8  3.4    Total Bilirubin 1.7  0.9  0.9    Alkaline Phosphatase 137  126  115    AST (SGOT) 33  28  20    ALT (SGPT) 26  18  20    Albumin/Globulin Ratio 1.1  1.2  1.3    BUN/Creatinine Ratio 15.2  14.5  10.1    Anion Gap 13.5  13.4  14.6      CBC          2/17/2025    13:26 3/9/2025    17:07 6/15/2025    22:11   CBC   WBC 3.91  5.44  5.56    RBC 5.98  5.35  5.56    Hemoglobin 13.7  11.9  12.9    Hematocrit 44.1  38.5  42.5    MCV 73.7  72.0  76.4    MCH 22.9  22.2  23.2    MCHC 31.1  30.9  30.4    RDW 15.0  14.6  15.5    Platelets 245  346  297      TSH          10/2/2024    00:47 3/9/2025    17:07   TSH   TSH 3.060  0.182      Lipid Panel          10/11/2024    04:48   Lipid Panel   Total Cholesterol 91    Triglycerides 142    HDL Cholesterol 38    VLDL Cholesterol 24    LDL Cholesterol  29  "   LDL/HDL Ratio 0.65       A1C Last 3 Results          10/11/2024    04:48   HGBA1C Last 3 Results   Hemoglobin A1C 5.60          Data reviewed: Cardiology studies    No results found for this or any previous visit.    Results for orders placed during the hospital encounter of 10/08/24    Stress Test With Myocardial Perfusion One Day    Interpretation Summary    Left ventricular ejection fraction is borderline normal (Calculated EF = 49%).    Stress MPI shows apical and apical anterior small area moderate intensity reversible defect consistent with ischemia. TID is 0.85 (normal)    Procedures  Results for orders placed during the hospital encounter of 10/10/24    Cardiac Catheterization/Vascular Study    Conclusion  [facility]  CARDIAC CATHETERIZATION PROCEDURE REPORT    Patient: Bisi Gutierrez  : 1970  MRN: [id number]    Procedure Date:  10/10/24    Referring Physician:  Self    Interventional Cardiologist:  Harriet Perez MD, [facility]    Indication:  Abnormal stress test showing apical and apical anterior reversible defect of mild intensity small size  Worsening chest pain on exertion, associated with back pain    Clinical Presentation:  54-year-old lady with history of paroxysmal atrial fibrillation, tachybradycardia syndrome status post pacemaker, hypertension, hyperlipidemia, type 2 diabetes who presented with worsening chest pain on exertion and had recent stress test that was abnormal showing apical and apical anterior small area of mild intensity reversible defect concerning for ischemia.    Procedure performed:  Left heart catheterization  Selective left and right coronary angiography  Left ventriculography  Ultrasound-guided access  Moderate sedation  Access site closure with TR band    Access Sites:  Right radial artery    Findings:  1. Coronary Artery Anatomy:  Dominance: Right coronary artery  Left Main: No significant stenosis  Left Anterior Descending: Mild luminal irregularities no  significant stenosis  Circumflex Artery: Mild luminal irregularities no significant stenosis  Right Coronary Artery: Mild luminal irregularities, no significant stenosis    2. Hemodynamics:  LVEDP 16 mmHg  No significant LV/AO gradient on pullback.    3. Left Ventriculogram:  Ejection Fraction: 55%  Wall Motion: No significant wall motion normalities  Mitral Regurgitation: No significant mitral regurgitation      Procedure Details:  Informed consent was obtained with an explanation of the risks, benefits and alternatives of the procedure. The patient was brought to the Cardiac Catheterization Laboratory and was prepped and draped in a standard sterile fashion. Moderate sedation with Fentanyl and Versed was administered by the circulating nurse. Lidocaine 2% was used to anesthetize the right radial artery and a 6 Slender sheath was placed.  A 5 F TGR catheter was then advanced over a 0.035 guidewire into the ascending aorta.  This catheter was used to engage the right and left coronary arteries with diagnostic angiography obtained in all appropriate projections by injection of non-ionic contrast. We then exchanged for an angled pigtail catheter and enter the left ventricle to measure hemodynamics and perform a left ventriculogram.  The catheter was then removed over a guidewire. The radial artery sheath was removed without difficulty and TR Band was placed over the access site with excellent hemostasis.    Patient tolerated the procedure well without any complications, and transferred to the post procedure area for recovery in a stable condition.      Complications:  None.    Estimated Blood Loss:  Minimal.    Conclusions:  No significant coronary disease with mild nonobstructive luminal irregularities  No significant aortic stenosis on pullback gradient  Normal LVEF  Noncardiac chest pain    Recommendations:  Likely noncardiac chest pain in nature, evaluate for other causes of chest pain/back pain.  Continue  guideline data medical therapy for prevention of CAD.  Patient on anticoagulation for A-fib.  Continue moderate intensity statins.  As needed nitrates  Will uptitrate on calcium channel blocker.  Follow-up with cardiology in 4 to 6 weeks postdischarge.    Status  Urgent    Electronically signed by Harriet Perez MD, 10/10/24, 3:49 PM EDT.        ASCVD Score  The ASCVD Risk score (Nick DK, et al., 2019) failed to calculate for the following reasons:    The valid total cholesterol range is 130 to 320 mg/dL         Assessment and Plan          Diagnoses and all orders for this visit:    1. Palpitations (Primary)  -     Sedimentation Rate; Future  -     C-reactive Protein; Future    2. Chest pain, atypical  -     Sedimentation Rate; Future  -     C-reactive Protein; Future          Assessment & Plan  1. History of Atrial Fibrillation:  - Reports chest soreness and irregular heartbeats.  - Pacemaker interrogation was done today that showed 10 months to HA, 0% V paced, no AT/AF episodes.  Patient suggested to follow-up in 6 months for possible generator change.  - Continue current medications: Eliquis, spironolactone, and Cardizem.  - Monitor for any new symptoms or changes in condition.      2. Blood Clots in Leg:  - Hospitalized last month for blood clots in the leg.  Will obtain reports.  - Initially treated with heparin, discontinued due to traces of blood in bowel movements.  - Continue current treatment for blood clots.  - Pending ultrasound report to assess the current status of the clots.  - Monitor for any signs of worsening or new symptoms.    3.atypical chest pain  - Patient recently went to the ER for chest pain/chest heaviness.  Chest pain worsens with deep breathing.  Will check ESR/CRP, possible pericarditis  - Patient had normal coronary arteries during recent left heart cath.  - Patient suggested to take some ibuprofen as needed.      The risks, benefits, and alternatives of the current treatment  plan were discussed, including the importance of monitoring for any new or worsening symptoms, potential side effects of medications, and the need for ongoing follow-up and imaging studies to assess the status of the blood clots and pacemaker function.    Follow-up appointment to be scheduled after pacemaker interrogation results are available.    PROCEDURE  Pacemaker interrogation was performed today.        Patient was educated on heart healthy diet, daily exercise and achieving optimal weight.     Follow Up     Return in about 6 months (around 12/16/2025) for Next scheduled follow up, With Bre Steen.      Bisi Gutierrez  reports that she has never smoked. She has never been exposed to tobacco smoke. She has never used smokeless tobacco.        I spent 30 minutes caring for this patient on this date of service. This time includes time spent by me in the following activities:preparing for the visit, reviewing tests, obtaining and/or reviewing a separately obtained history, performing a medically appropriate examination and/or evaluation , counseling and educating the patient/family/caregiver, ordering medications, tests, or procedures, referring and communicating with other health care professionals , documenting information in the medical record, independently interpreting results and communicating that information with the patient/family/caregiver, and care coordination.    I have reviewed the family history, social history, and past medical history for this patient. Previous information and data has been reviewed and updated as needed. I have reviewed and verified the chief complaint, history, and other documentation. The patient was interviewed and examined in the clinic and the chart reviewed. The previous observations, recommendations, and conclusions were reviewed including those of other providers.     The plan was discussed with the patient and/or family. The patient was given time to ask  questions and these questions were answered. At the conclusion of their visit they had no additional questions or concerns and all questions were answered to their satisfaction.     Patient was given instructions and counseling regarding her condition or for health maintenance advice. Please see specific information pulled into the AVS if appropriate.      Patient or patient representative verbalized consent for the use of Ambient Listening during the visit with  Harriet Perez MD for chart documentation. 6/16/2025  09:34 EDT      Harriet Perez MD, [facility]  06/16/25  09:12 EDT    Dictated Utilizing Dragon Dictation

## 2025-06-16 NOTE — ED PROVIDER NOTES
Time: 12:55 AM EDT  Date of encounter:  6/15/2025  Independent Historian/Clinical History and Information was obtained by:   Patient    History is limited by: N/A    Chief Complaint: Chest pain      History of Present Illness:  Patient is a 55 y.o. year old female who presents to the emergency department for evaluation of chest pain and sense of racing heart that started earlier today.  Patient has no cough or hemoptysis.  Patient denies nausea, vomiting, and diarrhea.  Patient has no leg pain or swelling.      Patient Care Team  Primary Care Provider: Provider, Daisy Known    Past Medical History:     Allergies   Allergen Reactions    Fentanyl Hives    Promethazine Itching and Rash     Past Medical History:   Diagnosis Date    A-fib     Atrial fibrillation with RVR     CHF (congestive heart failure)     DM (diabetes mellitus)     HTN (hypertension)     SVT (supraventricular tachycardia)      Past Surgical History:   Procedure Laterality Date    CARDIAC CATHETERIZATION N/A 10/10/2024    Procedure: Left Heart Cath with possible coronary angioplasty;  Surgeon: Harriet Perez MD;  Location: [facility] CATH INVASIVE LOCATION;  Service: Cardiology;  Laterality: N/A;    GALLBLADDER SURGERY      HERNIA REPAIR      HYSTERECTOMY      PACEMAKER IMPLANTATION  2014     History reviewed. No pertinent family history.    Home Medications:  Prior to Admission medications    Medication Sig Start Date End Date Taking? Authorizing Provider   atorvastatin (LIPITOR) 10 MG tablet Take 1 tablet by mouth Daily. 1/15/25   Harriet Perez MD   Continuous Glucose Sensor (FreeStyle Anupam 3 Plus Sensor) USE 1 SENSOR EVERY 15 DAYS AS DIRECTED 11/17/24   Capri Dewey MD   Creon 12163-81012 units capsule delayed-release particles capsule Take 2 capsules by mouth 3 (Three) Times a Day With Meals. 9/16/24   Capri Dewey MD   dapagliflozin Propanediol (Farxiga) 10 MG tablet Take 10 mg by mouth Daily.    Capri Dewey MD    dilTIAZem CD (CARDIZEM CD) 300 MG 24 hr capsule Take 1 capsule by mouth Daily. 1/15/25   Harriet Perez MD   Eliquis 5 MG tablet tablet Take 1 tablet by mouth 2 (Two) Times a Day. 1/15/25   Harriet Perez MD   furosemide (LASIX) 20 MG tablet Take 1 tablet by mouth Daily. 1/15/25   Harriet Perez MD   omeprazole (priLOSEC) 40 MG capsule Take 1 capsule by mouth Daily.    Capri Dewey MD   pancrelipase, Lip-Prot-Amyl, (CREON) 95275-28623 units capsule delayed-release particles capsule Take 1 capsule by mouth With Snacks for Snacks.    Capri Dewey MD   potassium chloride 10 MEQ CR tablet Take 1 tablet by mouth Daily. 1/15/25   Harriet Perez MD   spironolactone (ALDACTONE) 25 MG tablet Take 1 tablet by mouth Daily. 1/15/25   Harriet Perez MD   sucralfate (CARAFATE) 1 g tablet Take 1 tablet by mouth 3 (Three) Times a Day With Meals. May dissolve tablets in a small amount of water to help with swallowing 3/9/25   Dewayne Freitas MD   Tirzepatide (Mounjaro) 7.5 MG/0.5ML solution auto-injector Inject 7.5 mg under the skin into the appropriate area as directed Every 7 (Seven) Days. 11/21/24   Capri Dewey MD        Social History:   Social History     Tobacco Use    Smoking status: Never    Smokeless tobacco: Never   Vaping Use    Vaping status: Never Used   Substance Use Topics    Alcohol use: Never    Drug use: Never         Review of Systems:  Review of Systems   Constitutional:  Negative for chills and fever.   HENT:  Negative for congestion, rhinorrhea and sore throat.    Eyes:  Negative for pain and visual disturbance.   Respiratory:  Negative for apnea, cough, chest tightness and shortness of breath.    Cardiovascular:  Positive for chest pain. Negative for palpitations.   Gastrointestinal:  Negative for abdominal pain, diarrhea, nausea and vomiting.   Genitourinary:  Negative for difficulty urinating and dysuria.   Musculoskeletal:  Negative for joint swelling and  "myalgias.   Skin:  Negative for color change.   Neurological:  Negative for seizures and headaches.   Psychiatric/Behavioral: Negative.     All other systems reviewed and are negative.       Physical Exam:  /91 (BP Location: Right arm, Patient Position: Sitting)   Pulse 93   Temp 98 °F (36.7 °C) (Oral)   Resp 20   Ht 177.8 cm (70\")   Wt 111 kg (244 lb 11.4 oz)   SpO2 100%   BMI 35.11 kg/m²     Physical Exam  Vitals and nursing note reviewed.   Constitutional:       General: She is not in acute distress.     Appearance: Normal appearance. She is not toxic-appearing.   HENT:      Head: Normocephalic and atraumatic.      Jaw: There is normal jaw occlusion.   Eyes:      General: Lids are normal.      Extraocular Movements: Extraocular movements intact.      Conjunctiva/sclera: Conjunctivae normal.      Pupils: Pupils are equal, round, and reactive to light.   Cardiovascular:      Rate and Rhythm: Normal rate and regular rhythm.      Pulses: Normal pulses.      Heart sounds: Normal heart sounds.   Pulmonary:      Effort: Pulmonary effort is normal. No respiratory distress.      Breath sounds: Normal breath sounds. No wheezing or rhonchi.   Abdominal:      General: Abdomen is flat.      Palpations: Abdomen is soft.      Tenderness: There is no abdominal tenderness. There is no guarding or rebound.   Musculoskeletal:         General: Normal range of motion.      Cervical back: Normal range of motion and neck supple.      Right lower leg: No edema.      Left lower leg: No edema.   Skin:     General: Skin is warm and dry.   Neurological:      Mental Status: She is alert and oriented to person, place, and time. Mental status is at baseline.   Psychiatric:         Mood and Affect: Mood normal.                    Medical Decision Making:      Comorbidities that affect care:    Atrial fibrillation    External Notes reviewed:    Previous ED Note: Patient was last seen in the ED for a fall.      The following orders " were placed and all results were independently analyzed by me:  Orders Placed This Encounter   Procedures    XR Chest 1 View    [place] Draw    Comprehensive Metabolic Panel    BNP    High Sensitivity Troponin T    CBC Auto Differential    High Sensitivity Troponin T 1Hr    NPO Diet NPO Type: Strict NPO    Undress & Gown    Continuous Pulse Oximetry    Vital Signs    Oxygen Therapy- Nasal Cannula; Titrate 1-6 LPM Per SpO2; 90 - 95%    ECG 12 Lead ED Triage Standing Order; SOA    Insert Peripheral IV    CBC & Differential    Green Top (Gel)    Lavender Top    Gold Top - SST    Light Blue Top       Medications Given in the Emergency Department:  Medications   sodium chloride 0.9 % flush 10 mL (has no administration in time range)        ED Course:         Labs:    Lab Results (last 24 hours)       Procedure Component Value Units Date/Time    CBC & Differential [[id number]]  (Abnormal) Collected: 06/15/25 2211    Specimen: Blood Updated: 06/15/25 2223    Narrative:      The following orders were created for panel order CBC & Differential.  Procedure                               Abnormality         Status                     ---------                               -----------         ------                     CBC Auto Differential[[id number]]        Abnormal            Final result                 Please view results for these tests on the individual orders.    Comprehensive Metabolic Panel [[id number]]  (Abnormal) Collected: 06/15/25 2211    Specimen: Blood Updated: 06/15/25 2334     Glucose 150 mg/dL      BUN 11.4 mg/dL      Creatinine 1.13 mg/dL      Sodium 141 mmol/L      Potassium 4.2 mmol/L      Comment: Slight hemolysis detected by analyzer. Result may be falsely elevated.        Chloride 104 mmol/L      CO2 22.4 mmol/L      Calcium 9.5 mg/dL      Total Protein 7.8 g/dL      Albumin 4.4 g/dL      ALT (SGPT) 20 U/L      AST (SGOT) 20 U/L      Alkaline Phosphatase 115 U/L      Total Bilirubin 0.9 mg/dL      Globulin  3.4 gm/dL      A/G Ratio 1.3 g/dL      BUN/Creatinine Ratio 10.1     Anion Gap 14.6 mmol/L      eGFR 58.3 mL/min/1.73     Narrative:      GFR Categories in Chronic Kidney Disease (CKD)              GFR Category          GFR (mL/min/1.73)    Interpretation  G1                    90 or greater        Normal or high (1)  G2                    60-89                Mild decrease (1)  G3a                   45-59                Mild to moderate decrease  G3b                   30-44                Moderate to severe decrease  G4                    15-29                Severe decrease  G5                    14 or less           Kidney failure    (1)In the absence of evidence of kidney disease, neither GFR category G1 or G2 fulfill the criteria for CKD.    eGFR calculation 2021 CKD-EPI creatinine equation, which does not include race as a factor    BNP [[id number]]  (Normal) Collected: 06/15/25 2211    Specimen: Blood Updated: 06/15/25 2331     proBNP 52.8 pg/mL     Narrative:      This assay is used as an aid in the diagnosis of individuals suspected of having heart failure. It can be used as an aid in the diagnosis of acute decompensated heart failure (ADHF) in patients presenting with signs and symptoms of ADHF to the emergency department (ED). In addition, NT-proBNP of <300 pg/mL indicates ADHF is not likely.    Age Range Result Interpretation  NT-proBNP Concentration (pg/mL:      <50             Positive            >450                   Gray                 300-450                    Negative             <300    50-75           Positive            >900                  Gray                300-900                  Negative            <300      >75             Positive            >1800                  Gray                300-1800                  Negative            <300    High Sensitivity Troponin T [[id number]]  (Normal) Collected: 06/15/25 2211    Specimen: Blood Updated: 06/15/25 2334     HS Troponin T <6 ng/L      Narrative:      High Sensitive Troponin T Reference Range:  <14.0 ng/L- Negative Female for AMI  <22.0 ng/L- Negative Male for AMI  >=14 - Abnormal Female indicating possible myocardial injury.  >=22 - Abnormal Male indicating possible myocardial injury.   Clinicians would have to utilize clinical acumen, EKG, Troponin, and serial changes to determine if it is an Acute Myocardial Infarction or myocardial injury due to an underlying chronic condition.         CBC Auto Differential [[id number]]  (Abnormal) Collected: 06/15/25 2211    Specimen: Blood Updated: 06/15/25 2223     WBC 5.56 10*3/mm3      RBC 5.56 10*6/mm3      Hemoglobin 12.9 g/dL      Hematocrit 42.5 %      MCV 76.4 fL      MCH 23.2 pg      MCHC 30.4 g/dL      RDW 15.5 %      RDW-SD 42.2 fl      MPV 9.9 fL      Platelets 297 10*3/mm3      Neutrophil % 41.2 %      Lymphocyte % 48.0 %      Monocyte % 7.9 %      Eosinophil % 2.0 %      Basophil % 0.7 %      Immature Grans % 0.2 %      Neutrophils, Absolute 2.29 10*3/mm3      Lymphocytes, Absolute 2.67 10*3/mm3      Monocytes, Absolute 0.44 10*3/mm3      Eosinophils, Absolute 0.11 10*3/mm3      Basophils, Absolute 0.04 10*3/mm3      Immature Grans, Absolute 0.01 10*3/mm3      nRBC 0.0 /100 WBC     High Sensitivity Troponin T 1Hr [[id number]] Collected: 06/16/25 0003    Specimen: Blood from Hand, Right Updated: 06/16/25 0039     HS Troponin T <6 ng/L      Troponin T Numeric Delta --     Comment: Unable to calculate.       Narrative:      High Sensitive Troponin T Reference Range:  <14.0 ng/L- Negative Female for AMI  <22.0 ng/L- Negative Male for AMI  >=14 - Abnormal Female indicating possible myocardial injury.  >=22 - Abnormal Male indicating possible myocardial injury.   Clinicians would have to utilize clinical acumen, EKG, Troponin, and serial changes to determine if it is an Acute Myocardial Infarction or myocardial injury due to an underlying chronic condition.                  Imaging:    XR Chest 1  View  Result Date: 6/15/2025  XR CHEST 1 VW-  Date of exam: 6/15/2025 10:19 PM.  Comparison: 3/9/2025.  INDICATIONS: 55-year-old female with history of chest pressure.  FINDINGS: A single AP (or PA) upright portable chest radiograph was performed. There is slight pulmonary hypoinflation. No cardiac enlargement is seen. No acute infiltrate is appreciated. No pleural effusion or pneumothorax is identified. There is a right-sided cardiac implantable electronic device (CIED) in place, which was seen previously and is unchanged in position. The ascending aorta appears mildly ectatic. There is mild tortuosity of the descending aorta. Mild atherosclerotic changes noted. The patient has undergone cholecystectomy. External artifacts obscure detail. Degenerative changes involve the imaged spine and the bilateral shoulders. No significant interval change is seen since the prior study (or studies).       No acute infiltrate is appreciated. No cardiac enlargement. Right-sided CIED. Please see above comments for further detail.    Portions of this note were completed with a voice recognition program.  6/15/2025 10:57 PM by Rajat Rose MD on Workstation: [employer]          Differential Diagnosis and Discussion:    Chest Pain:  Based on the patient's signs and symptoms, I considered aortic dissection, myocardial infaction, pulmonary embolism, cardiac tamponade, pericarditis, pneumothorax, musculoskeletal chest pain and other differential diagnosis as an etiology of the patient's chest pain.     PROCEDURES:    Labs were collected in the emergency department and all labs were reviewed and interpreted by me.  X-ray were performed in the emergency department and all X-ray impressions were independently interpreted by me.  An EKG was performed and the EKG was interpreted by me.    ECG 12 Lead ED Triage Standing Order; SOA   Preliminary Result   HEART RATE=92  bpm   RR Gkayzpqp=277  ms   MN Ubnvoilz=914  ms   P Horizontal Axis=65  deg    P Front Axis=60  deg   QRSD Interval=80  ms   QT Bmfnmypg=582  ms   BDtO=722  ms   QRS Axis=59  deg   T Wave Axis=  deg   - ABNORMAL ECG -   Sinus rhythm   Low voltage, precordial leads   Consider anteroseptal infarct   Date and Time of Study:2025-06-15 22:18:46          Procedures    MDM       The patient had an EKG that shows no acute changes. Specifically, there are no ST elevations, t-wave changes of concern, delta waves, or rhythm abnormalities warranting admission. The patient was placed on the cardiac monitor and observed with continuous telemetry. The patient has a chest x-ray interpreted by me that is negative for pneumothorax, pneumonia, and is essentially unremarkable. The patient has had unelevated troponins on blood draw.      The patient is resting comfortably and feels better, is alert and in no distress.  The patient´s CBC that was reviewed and interpreted by me shows no abnormalities of critical concern. Of note, there is no anemia requiring a blood transfusion and the platelet count is acceptable.  The patient´s CMP that was reviewed and interpretted by me shows no abnormalities of critical concern. Of note, the patient´s sodium and potassium are acceptable. The patient´s liver enzymes are unremarkable. The patient´s renal function (creatinine) is preserved. The patient has a normal anion gap.  Troponin x 2 is negative.  The repeat examination is unremarkable and benign. Electrocardiogram shows no signs of acute ischemia and the history, exam, diagnostic testing and current condition did not suggest that this patient is having an acute myocardial infarction, significant arrhythmia, unstable angina, esophageal perforation, pulmonary embolism, aortic dissection, severe pneumonia, sepsis for other significant pathology that would warrant further testing, continued ED treatment, admission, cardiology or other specialist consultation at this point. The vital signs have been stable. The patient's  condition is stable and appropriate for discharge. The patient will pursue further outpatient evaluation with the primary care physician, or designated physician or cardiologist. The patient has expressed a clear and thorough understanding and agreed to follow-up as instructed.              Patient Care Considerations:    PERC: I used the PERC score to risk stratify the patient for PE and a CT of the chest was considered but ultimately not indicated in today's visit.      Consultants/Shared Management Plan:    None    Social Determinants of Health:    Patient is independent, reliable, and has access to care.       Disposition and Care Coordination:    Discharged: I considered escalation of care by admitting this patient to the hospital, however patient has an appointment with Dr. Mensah tomorrow morning and is stable and suitable for discharge.    I have explained the patient´s condition, diagnoses and treatment plan based on the information available to me at this time. I have answered questions and addressed any concerns. The patient has a good  understanding of the patient´s diagnosis, condition, and treatment plan as can be expected at this point. The vital signs have been stable. The patient´s condition is stable and appropriate for discharge from the emergency department.      The patient will pursue further outpatient evaluation with the primary care physician or other designated or consulting physician as outlined in the discharge instructions. They are agreeable to this plan of care and follow-up instructions have been explained in detail. The patient has received these instructions in written format and has expressed an understanding of the discharge instructions. The patient is aware that any significant change in condition or worsening of symptoms should prompt an immediate return to this or the closest emergency department or call to 911.  I have explained discharge medications and the need for follow  up with the patient/caretakers. This was also printed in the discharge instructions. Patient was discharged with the following medications and follow up:      Medication List      No changes were made to your prescriptions during this visit.      Harriet Perez MD  [address] [place] [address]  [phone]    In 1 day         Final diagnoses:   Chest pain, unspecified type        ED Disposition       ED Disposition   Discharge    Condition   Stable    Comment   --               This medical record created using voice recognition software.             Martin Tony MD  06/16/25 0056

## 2025-06-16 NOTE — ED TRIAGE NOTES
"CP/Soreness; hx of afib RVR; pt concerned about \"racing heart\".  Pt c/o SOA.  HR @ 83 in triage, o2 100 RA.    "

## 2025-07-01 ENCOUNTER — OFFICE VISIT (OUTPATIENT)
Dept: FAMILY MEDICINE CLINIC | Facility: CLINIC | Age: 55
End: 2025-07-01
Payer: COMMERCIAL

## 2025-07-01 VITALS
HEIGHT: 70 IN | BODY MASS INDEX: 33.04 KG/M2 | WEIGHT: 230.8 LBS | OXYGEN SATURATION: 100 % | SYSTOLIC BLOOD PRESSURE: 122 MMHG | DIASTOLIC BLOOD PRESSURE: 76 MMHG | TEMPERATURE: 97.8 F | HEART RATE: 82 BPM

## 2025-07-01 DIAGNOSIS — Z02.89 MEDICATION MANAGEMENT CONTRACT SIGNED: ICD-10-CM

## 2025-07-01 DIAGNOSIS — E11.9 TYPE 2 DIABETES MELLITUS WITHOUT COMPLICATION, WITHOUT LONG-TERM CURRENT USE OF INSULIN: Chronic | ICD-10-CM

## 2025-07-01 DIAGNOSIS — M19.90 CHRONIC INFLAMMATORY ARTHRITIS: Primary | ICD-10-CM

## 2025-07-01 LAB
ALBUMIN SERPL-MCNC: 4.4 G/DL (ref 3.5–5.2)
ALBUMIN UR-MCNC: <1.2 MG/DL
ALBUMIN/GLOB SERPL: 1.3 G/DL
ALP SERPL-CCNC: 106 U/L (ref 39–117)
ALT SERPL W P-5'-P-CCNC: 24 U/L (ref 1–33)
AMPHET+METHAMPHET UR QL: NEGATIVE
AMPHETAMINES UR QL: NEGATIVE
ANION GAP SERPL CALCULATED.3IONS-SCNC: 11.2 MMOL/L (ref 5–15)
AST SERPL-CCNC: 25 U/L (ref 1–32)
BARBITURATES UR QL SCN: NEGATIVE
BENZODIAZ UR QL SCN: NEGATIVE
BILIRUB SERPL-MCNC: 0.8 MG/DL (ref 0–1.2)
BUN SERPL-MCNC: 13 MG/DL (ref 6–20)
BUN/CREAT SERPL: 12.5 (ref 7–25)
BUPRENORPHINE SERPL-MCNC: NEGATIVE NG/ML
CALCIUM SPEC-SCNC: 10 MG/DL (ref 8.6–10.5)
CANNABINOIDS SERPL QL: NEGATIVE
CHLORIDE SERPL-SCNC: 104 MMOL/L (ref 98–107)
CO2 SERPL-SCNC: 24.8 MMOL/L (ref 22–29)
COCAINE UR QL: NEGATIVE
CREAT SERPL-MCNC: 1.04 MG/DL (ref 0.57–1)
CREAT UR-MCNC: 119 MG/DL
EGFRCR SERPLBLD CKD-EPI 2021: 64.4 ML/MIN/1.73
FENTANYL UR-MCNC: NEGATIVE NG/ML
GLOBULIN UR ELPH-MCNC: 3.5 GM/DL
GLUCOSE SERPL-MCNC: 99 MG/DL (ref 65–99)
HBA1C MFR BLD: 5.8 % (ref 4.8–5.6)
METHADONE UR QL SCN: NEGATIVE
MICROALBUMIN/CREAT UR: NORMAL MG/G{CREAT}
OPIATES UR QL: NEGATIVE
OXYCODONE UR QL SCN: NEGATIVE
PCP UR QL SCN: NEGATIVE
POTASSIUM SERPL-SCNC: 4.6 MMOL/L (ref 3.5–5.2)
PROT SERPL-MCNC: 7.9 G/DL (ref 6–8.5)
SODIUM SERPL-SCNC: 140 MMOL/L (ref 136–145)
TRICYCLICS UR QL SCN: NEGATIVE
URATE SERPL-MCNC: 5.4 MG/DL (ref 2.4–5.7)

## 2025-07-01 PROCEDURE — 80053 COMPREHEN METABOLIC PANEL: CPT | Performed by: FAMILY MEDICINE

## 2025-07-01 PROCEDURE — 84550 ASSAY OF BLOOD/URIC ACID: CPT | Performed by: FAMILY MEDICINE

## 2025-07-01 PROCEDURE — 83036 HEMOGLOBIN GLYCOSYLATED A1C: CPT | Performed by: FAMILY MEDICINE

## 2025-07-01 PROCEDURE — 82570 ASSAY OF URINE CREATININE: CPT | Performed by: FAMILY MEDICINE

## 2025-07-01 PROCEDURE — 80307 DRUG TEST PRSMV CHEM ANLYZR: CPT | Performed by: FAMILY MEDICINE

## 2025-07-01 PROCEDURE — 82043 UR ALBUMIN QUANTITATIVE: CPT | Performed by: FAMILY MEDICINE

## 2025-07-01 RX ORDER — GABAPENTIN 100 MG/1
100 CAPSULE ORAL 2 TIMES DAILY PRN
Qty: 60 CAPSULE | Refills: 1 | Status: SHIPPED | OUTPATIENT
Start: 2025-07-01

## 2025-07-01 NOTE — LETTER
July 1, 2025     Patient: Bisi Gutierrez   YOB: 1970   Date of Visit: 7/1/2025       To Whom It May Concern:    It is my medical opinion that Bisi Gutierrez may return to work in one day.            Sincerely,        Katie Powell MD    CC: No Recipients

## 2025-07-01 NOTE — PROGRESS NOTES
Patient or patient representative verbalized consent for the use of Ambient Listening during the visit with  Katie Powell MD for chart documentation. 7/1/2025  17:32 EDT    Chief Complaint  Establish Care    Subjective      Bisi Gutierrez is a 55 y.o. female who presents to [facility]     History of Present Illness  The patient is a 55-year-old female presenting to [facility]. She requests an increase in Jardiance and reports knee arthritis. She has pain in her left index finger and thumb, radiating from the base of the thumb. She experiences leg buckling, occasional pain, and frequent falls, with the left leg worse than the right. She wants to review previous lab work.    She is on the maximum dose of Jardiance 25 mg and takes Eliquis 5 mg twice daily for leg blood clots. Severe knee pain when transitioning from sitting to standing causes stiffness and instability, leading to falls. A fall while carrying a baby resulted in a large hematoma and persistent pain. Initially treated with a heparin drip, she was switched to Eliquis upon discharge. She has shooting pains up the back of her leg, disrupting sleep, and lower back pain radiating to her feet.    Diagnosed with rheumatoid arthritis, she experiences significant discomfort in her left leg and hand joints, hindering her ability to grasp objects. Pain originates at the base of her thumb and extends up both fingers, with numbness in two fingers. Pain intensifies with hand use. Diagnosed with osteoarthritis in her left knee, identified during an examination for a broken vein following a fall. She has not consulted a rheumatologist. Tylenol and ibuprofen are ineffective; she has not tried gabapentin.    She underwent heart surgery in January 2025 and has a chest device. Occasional rhythm disturbances do not require hospitalization. A recent ER visit for chest pain showed sinus rhythm and no congestive heart failure. Her  "pacemaker was interrogated, with about 6 months left before replacement. Advised to have a mesh placed in her aorta to prevent clots getting to heart/lungs/brain, she is under Vascular Dr.'s care.    She has nonalcoholic fatty liver syndrome and Gilbert's syndrome, causing yellow stools.    PAST SURGICAL HISTORY:  - Heart surgery in January 2025  - Device implanted in chest    SOCIAL HISTORY  She does not smoke. She drinks alcohol moderately.        Patient Care Team:  Katie Powell MD as PCP - General (Family Medicine)    Objective   Vital Signs:   Vitals:    07/01/25 1118   BP: 122/76   Pulse: 82   Temp: 97.8 °F (36.6 °C)   SpO2: 100%   Weight: 105 kg (230 lb 12.8 oz)   Height: 177.8 cm (70\")     Body mass index is 33.12 kg/m².    Wt Readings from Last 3 Encounters:   07/01/25 105 kg (230 lb 12.8 oz)   06/16/25 106 kg (232 lb 11.2 oz)   06/15/25 111 kg (244 lb 11.4 oz)     BP Readings from Last 3 Encounters:   07/01/25 122/76   06/16/25 137/88   06/16/25 113/80       Health Maintenance   Topic Date Due    DIABETIC EYE EXAM  Never done    URINE MICROALBUMIN-CREATININE RATIO (uACR)  Never done    Hepatitis B (1 of 3 - 19+ 3-dose series) Never done    Pneumococcal Vaccine 50+ (1 of 2 - PCV) Never done    TDAP/TD VACCINES (1 - Tdap) Never done    ZOSTER VACCINE (1 of 2) Never done    DIABETIC FOOT EXAM  04/27/2024    HEPATITIS C SCREENING  Never done    ANNUAL PHYSICAL  Never done    COVID-19 Vaccine (5 - 2024-25 season) 09/01/2024    INFLUENZA VACCINE  07/01/2025    HEMOGLOBIN A1C  10/14/2025    MAMMOGRAM  10/23/2026    COLORECTAL CANCER SCREENING  05/01/2035       Lab Results (last 24 hours)       Procedure Component Value Units Date/Time    Uric Acid [[id number]] Collected: 07/01/25 1259    Specimen: Blood Updated: 07/01/25 1259    Hemoglobin A1c [[id number]] Collected: 07/01/25 1259    Specimen: Blood Updated: 07/01/25 1259    Urine Drug Screen - Urine, Clean Catch [[id number]] Collected: 07/01/25 1259    " Specimen: Urine, Clean Catch Updated: 07/01/25 1259    Microalbumin / Creatinine Urine Ratio - Urine, Clean Catch [[id number]] Collected: 07/01/25 1259    Specimen: Urine, Clean Catch Updated: 07/01/25 1259    Comprehensive metabolic panel [[id number]] Collected: 07/01/25 1259    Specimen: Blood Updated: 07/01/25 1259               Physical Exam     Physical Exam  General Appearance: Normal.  Vital signs: Within normal limits.  HEENT: Within normal limits.  Respiratory: Clear to auscultation, no wheezing, rales, or rhonchi.  Cardiovascular: Regular rate and rhythm, no murmurs, rubs, or gallops.  Back, Musculoskeletal: Positive effusion in the left knee joint, tenderness and firmness noted in the left knee, pain radiating from the knee to the calf, limited mobility due to pain and stiffness.  Extremities: Significant effusion in the left knee.  Skin: No redness or irritation at the site of pain.  Neurological: Normal.      Result Review   The following data was reviewed by: Katie Powell MD on 07/01/2025:  [x]  Tests & Results  []  Hospitalization/Emergency Department/Urgent Care  []  Internal/External Consultant Notes    Results  - Labs:    - proBNP: No elevated proBNP, no signs of congestive heart failure    - Imaging:    - X-ray of the left knee: Osteoarthritis, predominantly medial and patellofemoral compartment      Procedures          ASSESSMENT/PLAN  Assessment & Plan  Chronic inflammatory arthritis    Orders:    gabapentin (NEURONTIN) 100 MG capsule; Take 1 capsule by mouth 2 (Two) Times a Day As Needed (pain).    XR Finger 2+ View Left; Future    Uric Acid    Urine Drug Screen - Urine, Clean Catch    Comprehensive metabolic panel    Type 2 diabetes mellitus without complication, without long-term current use of insulin      Orders:    Hemoglobin A1c    Microalbumin / Creatinine Urine Ratio - Urine, Clean Catch    Comprehensive metabolic panel    Medication management contract signed    Orders:    Urine  Drug Screen - Urine, Clean Catch         Assessment & Plan  1. Type 2 diabetes mellitus: Stable.  - Currently on the maximum dose of Jardiance 25 mg and Mounjaro.  - Advised to incorporate more celery and spinach into the diet, along with apples for flavor. Excessive spinach consumption should be avoided due to its high vitamin K content, which can interfere with blood thinner medication. Cabbage was suggested as an alternative.  - A low-purine diet was recommended, including daily dairy intake, preferably unsweetened Greek yogurt added to smoothies to help prevent blood sugar spikes by balancing protein intake.  - An A1c test will be conducted today as there is no record of one in the system from the past 6 months. A urine microalbumin creatinine test will also be performed to assess kidney function in relation to diabetes.    2. Rheumatoid arthritis: Chronic.  - Reports significant pain in the left index finger and thumb, with trouble holding things. Pain is located at the base of the thumb and radiates up both fingers.  - Gabapentin 100 mg twice daily was prescribed for pain management, with the option to increase to three times daily if necessary. Potential side effects, including drowsiness and brain fog, were discussed.  - A urine drug screen will be conducted due to the controlled substance nature of gabapentin.  - Referral to rheumatology for further evaluation and management.    3. Osteoarthritis of the left knee: Chronic.  - Reports significant pain and stiffness in the left knee, especially when transitioning from sitting to standing. Knee pain has worsened since a recent fall. Physical exam reveals significant effusion in the left knee. X-ray shows osteoarthritis predominantly in the medial and patellofemoral compartments.  - An x-ray of the left foot will be ordered to rule out carpal tunnel syndrome versus arthritis. A uric acid level test will also be conducted to exclude gout as a potential cause of  symptoms.  - Referral to orthopedics for evaluation and discussion of treatment options, including potential steroid injections.    4. Blood clots in the leg: Chronic.  - Currently on Eliquis 5 mg twice daily for blood clots in the leg. Reports a history of a large hematoma and bruising following a fall, which was treated with a heparin drip and subsequently Eliquis.  - Continues to experience pain and swelling in the affected leg.    5. Atrial fibrillation: Chronic.  - History of atrial fibrillation and underwent heart surgery in 01/2025, with a device placed in the chest. Reports occasional rhythm disturbances but no significant issues requiring hospitalization. Labs from 15 Sheila 2025 showed no elevated proBNP, indicating no signs of congestive heart failure.    6. Nonalcoholic fatty liver disease: Chronic.  - History of nonalcoholic fatty liver disease and Gilbert's syndrome, which causes stools to appear yellow.  - Comprehensive panel will be ordered to assess liver function.    Follow-up  - A1c test  - Urine microalbumin creatinine test  - Uric acid level test  - Comprehensive panel to assess liver function      Bisi Gutierrez  reports that she has never smoked. She has never been exposed to tobacco smoke. She has never used smokeless tobacco.            FOLLOW UP  No follow-ups on file.  Patient was given instructions and counseling regarding her condition or for health maintenance advice. Please see specific information pulled into the AVS if appropriate.       Katie Powell MD  07/01/25  17:32 EDT    Part of this note may be an electronic transcription/translation of spoken language to printed text using the Dragon Dictation System.

## 2025-07-01 NOTE — ASSESSMENT & PLAN NOTE
{Diabetes (Optional):[id number]}    Orders:    Hemoglobin A1c    Microalbumin / Creatinine Urine Ratio - Urine, Clean Catch    Comprehensive metabolic panel

## 2025-07-03 LAB
QT INTERVAL: 370 MS
QTC INTERVAL: 458 MS

## 2025-07-30 ENCOUNTER — RESULTS FOLLOW-UP (OUTPATIENT)
Dept: FAMILY MEDICINE CLINIC | Facility: CLINIC | Age: 55
End: 2025-07-30
Payer: COMMERCIAL

## 2025-07-30 NOTE — LETTER
Bisi Gutierrez  [address]  [address]  [place] [address]    July 31, 2025     Dear Ms. Gutierrez:    Below are the results from your recent visit:    Resulted Orders   Uric Acid   Result Value Ref Range    Uric Acid 5.4 2.4 - 5.7 mg/dL   Hemoglobin A1c   Result Value Ref Range    Hemoglobin A1C 5.80 (H) 4.80 - 5.60 %   Urine Drug Screen - Urine, Clean Catch   Result Value Ref Range    THC, Screen, Urine Negative Negative    Phencyclidine (PCP), Urine Negative Negative    Cocaine Screen, Urine Negative Negative    Methamphetamine, Ur Negative Negative    Opiate Screen Negative Negative    Amphetamine Screen, Urine Negative Negative    Benzodiazepine Screen, Urine Negative Negative    Tricyclic Antidepressants Screen Negative Negative    Methadone Screen, Urine Negative Negative    Barbiturates Screen, Urine Negative Negative    Oxycodone Screen, Urine Negative Negative    Buprenorphine, Screen, Urine Negative Negative   Microalbumin / Creatinine Urine Ratio - Urine, Clean Catch   Result Value Ref Range    Microalbumin/Creatinine Ratio        Comment:      Unable to calculate    Creatinine, Urine 119.0 mg/dL    Microalbumin, Urine <1.2 mg/dL   Comprehensive metabolic panel   Result Value Ref Range    Glucose 99 65 - 99 mg/dL    BUN 13.0 6.0 - 20.0 mg/dL    Creatinine 1.04 (H) 0.57 - 1.00 mg/dL    Sodium 140 136 - 145 mmol/L    Potassium 4.6 3.5 - 5.2 mmol/L    Chloride 104 98 - 107 mmol/L    CO2 24.8 22.0 - 29.0 mmol/L    Calcium 10.0 8.6 - 10.5 mg/dL    Total Protein 7.9 6.0 - 8.5 g/dL    Albumin 4.4 3.5 - 5.2 g/dL    ALT (SGPT) 24 1 - 33 U/L    AST (SGOT) 25 1 - 32 U/L    Alkaline Phosphatase 106 39 - 117 U/L    Total Bilirubin 0.8 0.0 - 1.2 mg/dL    Globulin 3.5 gm/dL    A/G Ratio 1.3 g/dL    BUN/Creatinine Ratio 12.5 7.0 - 25.0    Anion Gap 11.2 5.0 - 15.0 mmol/L    eGFR 64.4 >60.0 mL/min/1.73   Fentanyl, Urine - Urine, Clean Catch   Result Value Ref Range    Fentanyl, Urine Negative Negative       Labs  reviewed.  All normal except for mildly elevated creatinine although improved compared to previous.     Also hemoglobin A1c is slightly above normal but well-controlled for diabetic at 5.8.    If you have any questions or concerns, please don't hesitate to call.         Sincerely,        Katie Powell MD

## 2025-08-29 ENCOUNTER — TELEPHONE (OUTPATIENT)
Dept: CARDIOLOGY | Facility: CLINIC | Age: 55
End: 2025-08-29
Payer: COMMERCIAL

## (undated) DEVICE — TR BAND RADIAL ARTERY COMPRESSION DEVICE: Brand: TR BAND

## (undated) DEVICE — GW FC FLOP/TP .035 260CM 3MM

## (undated) DEVICE — GLIDESHEATH SLENDER ACCESS KIT: Brand: GLIDESHEATH SLENDER

## (undated) DEVICE — CATH F5INF TLPIGST145 110CM6SH: Brand: INFINITI

## (undated) DEVICE — RADIFOCUS OPTITORQUE ANGIOGRAPHIC CATHETER: Brand: OPTITORQUE

## (undated) DEVICE — RADIFOCUS GLIDEWIRE: Brand: GLIDEWIRE